# Patient Record
Sex: MALE | Race: WHITE | HISPANIC OR LATINO | Employment: FULL TIME | URBAN - NONMETROPOLITAN AREA
[De-identification: names, ages, dates, MRNs, and addresses within clinical notes are randomized per-mention and may not be internally consistent; named-entity substitution may affect disease eponyms.]

---

## 2021-06-19 ENCOUNTER — HOSPITAL ENCOUNTER (EMERGENCY)
Facility: HOSPITAL | Age: 46
Discharge: HOME | End: 2021-06-19
Attending: EMERGENCY MEDICINE
Payer: COMMERCIAL

## 2021-06-19 VITALS
SYSTOLIC BLOOD PRESSURE: 118 MMHG | TEMPERATURE: 97.9 F | DIASTOLIC BLOOD PRESSURE: 68 MMHG | BODY MASS INDEX: 33.05 KG/M2 | OXYGEN SATURATION: 95 % | HEART RATE: 76 BPM | HEIGHT: 64 IN | WEIGHT: 193.56 LBS | RESPIRATION RATE: 16 BRPM

## 2021-06-19 DIAGNOSIS — M54.32 SCIATICA OF LEFT SIDE: ICD-10-CM

## 2021-06-19 DIAGNOSIS — S16.1XXA STRAIN OF NECK MUSCLE, INITIAL ENCOUNTER: Primary | ICD-10-CM

## 2021-06-19 PROCEDURE — 99283 EMERGENCY DEPT VISIT LOW MDM: CPT

## 2021-06-19 PROCEDURE — 96372 THER/PROPH/DIAG INJ SC/IM: CPT

## 2021-06-19 PROCEDURE — 99284 EMERGENCY DEPT VISIT MOD MDM: CPT | Performed by: EMERGENCY MEDICINE

## 2021-06-19 RX ORDER — VITAMIN E 268 MG
400 CAPSULE ORAL DAILY
COMMUNITY
End: 2021-07-29 | Stop reason: ALTCHOICE

## 2021-06-19 RX ORDER — NAPROXEN 500 MG/1
500 TABLET ORAL 2 TIMES DAILY PRN
Qty: 30 TABLET | Refills: 0 | Status: SHIPPED | OUTPATIENT
Start: 2021-06-19 | End: 2021-12-01 | Stop reason: ALTCHOICE

## 2021-06-19 RX ORDER — CYCLOBENZAPRINE HCL 10 MG
10 TABLET ORAL 2 TIMES DAILY PRN
Qty: 20 TABLET | Refills: 0 | Status: SHIPPED | OUTPATIENT
Start: 2021-06-19 | End: 2021-12-01 | Stop reason: ALTCHOICE

## 2021-06-19 RX ORDER — KETOROLAC TROMETHAMINE 30 MG/ML
15 INJECTION, SOLUTION INTRAMUSCULAR; INTRAVENOUS ONCE
Status: COMPLETED | OUTPATIENT
Start: 2021-06-19 | End: 2021-06-19

## 2021-06-19 RX ADMIN — KETOROLAC TROMETHAMINE 15 MG: 30 INJECTION, SOLUTION INTRAMUSCULAR at 12:13

## 2021-06-19 NOTE — Clinical Note
Marilee Amharic was seen and treated in our emergency department on 6/19/2021  Diagnosis:     Cody Welch  may return to work on return date  He may return on this date: 06/21/2021         If you have any questions or concerns, please don't hesitate to call        Aliya Tracey DO    ______________________________           _______________          _______________  Hospital Representative                              Date                                Time

## 2021-06-19 NOTE — ED PROVIDER NOTES
History  Chief Complaint   Patient presents with    Neck Pain     past week    Headache     past week    Back Pain     past month     Patient presents emergency room with chief complaint of right-sided neck pain and left-sided back pain  Patient reports that ever since he started working for [employer] he is began experiencing these pains and aches  Patient reports that he does a fair amount of lifting at this job and that he has all overhead lifting and reaching many times throughout the course the day  With regards to the patient's neck pain patient reports that it is right-sided radiating into his right shoulder  He sometimes he sometimes can exacerbate this with movement of the right arm  No numbness or tingling  With regards to the left back pain  Patient reports he sometimes has a hot sensation going down his left leg to about the level of the knee  Is sometimes worse with movement  He has pain with palpation  Is slightly improved when he remains still  Patient has not used anything for control of his pain or discomfort        History provided by:  Patient  Neck Pain  Pain location:  R side  Quality:  Aching and cramping  Pain radiates to:  R shoulder, R scapula and R arm  Pain severity:  Moderate  Pain is:  Same all the time  Onset quality:  Gradual  Timing:  Intermittent  Progression:  Waxing and waning  Chronicity:  New  Context: lifting a heavy object    Context: not fall, not jumping from heights, not MCA, not MVC, not pedestrian accident and not recent injury    Relieved by:  None tried  Worsened by:  Bending  Ineffective treatments:  None tried  Associated symptoms: headaches and leg pain    Associated symptoms: no bladder incontinence, no bowel incontinence, no chest pain, no fever, no numbness, no paresis, no photophobia and no weakness    Headaches:     Severity:  Mild    Onset quality:  Gradual  Headache  Associated symptoms: back pain and neck pain    Associated symptoms: no abdominal pain, no congestion, no cough, no diarrhea, no dizziness, no ear pain, no fatigue, no fever, no myalgias, no nausea, no numbness, no photophobia, no sinus pressure, no sore throat, no vomiting and no weakness    Back Pain  Location:  Lumbar spine  Quality:  Aching  Radiates to:  L posterior upper leg and L thigh  Pain severity:  Moderate  Pain is:  Same all the time  Onset quality:  Gradual  Timing:  Constant  Progression:  Worsening  Chronicity:  New  Context: not emotional stress and not falling    Associated symptoms: headaches and leg pain    Associated symptoms: no abdominal pain, no bladder incontinence, no bowel incontinence, no chest pain, no dysuria, no fever, no numbness and no weakness        Prior to Admission Medications   Prescriptions Last Dose Informant Patient Reported? Taking?   vitamin E, tocopherol, 400 units capsule   Yes No   Sig: Take 400 Units by mouth daily      Facility-Administered Medications: None       Past Medical History:   Diagnosis Date    Asthma        History reviewed  No pertinent surgical history  History reviewed  No pertinent family history  I have reviewed and agree with the history as documented  E-Cigarette/Vaping    E-Cigarette Use Never User      E-Cigarette/Vaping Substances     Social History     Tobacco Use    Smoking status: Never Smoker    Smokeless tobacco: Never Used   Vaping Use    Vaping Use: Never used   Substance Use Topics    Alcohol use: Not Currently    Drug use: Not Currently       Review of Systems   Constitutional: Negative for activity change, fatigue and fever  HENT: Negative for congestion, ear pain, rhinorrhea, sinus pressure and sore throat  Eyes: Negative  Negative for photophobia, redness and itching  Respiratory: Negative for cough, chest tightness, shortness of breath and wheezing  Cardiovascular: Negative for chest pain, palpitations and leg swelling     Gastrointestinal: Negative for abdominal pain, bowel incontinence, diarrhea, nausea and vomiting  Endocrine: Negative  Genitourinary: Negative for bladder incontinence, dysuria, flank pain and frequency  Musculoskeletal: Positive for back pain and neck pain  Negative for arthralgias and myalgias  Skin: Negative  Negative for pallor and rash  Allergic/Immunologic: Negative  Neurological: Positive for headaches  Negative for dizziness, weakness and numbness  Hematological: Negative  Psychiatric/Behavioral: Negative  All other systems reviewed and are negative  Physical Exam  Physical Exam  Vitals and nursing note reviewed  Constitutional:       Appearance: Normal appearance  He is well-developed  He is not toxic-appearing  HENT:      Head: Normocephalic and atraumatic  Hair is normal       Jaw: No pain on movement  Right Ear: External ear normal       Left Ear: External ear normal       Nose: Nose normal       Mouth/Throat:      Mouth: Mucous membranes are moist       Pharynx: Oropharynx is clear  Eyes:      General: Lids are normal  No scleral icterus  Extraocular Movements: Extraocular movements intact  Conjunctiva/sclera: Conjunctivae normal       Pupils: Pupils are equal, round, and reactive to light  Neck:      Vascular: No carotid bruit  Trachea: Trachea normal      Cardiovascular:      Rate and Rhythm: Normal rate and regular rhythm  Heart sounds: Normal heart sounds  No murmur heard  Pulmonary:      Effort: Pulmonary effort is normal  No respiratory distress  Breath sounds: Normal breath sounds  No decreased breath sounds, wheezing, rhonchi or rales  Abdominal:      General: Abdomen is flat  There is no distension  Palpations: Abdomen is soft  Abdomen is not rigid  Tenderness: There is no abdominal tenderness  Musculoskeletal:         General: No deformity or signs of injury  Cervical back: Normal range of motion and neck supple  Spasms and tenderness present   No edema, erythema, signs of trauma, rigidity or crepitus  Pain with movement and muscular tenderness present  No spinous process tenderness  Lumbar back: Tenderness present  No swelling, edema, lacerations or spasms  Decreased range of motion  Negative right straight leg raise test and negative left straight leg raise test       Right lower leg: No edema  Left lower leg: No edema  Lymphadenopathy:      Cervical: No cervical adenopathy  Skin:     General: Skin is warm and dry  Coloration: Skin is not pale  Findings: No rash  Neurological:      General: No focal deficit present  Mental Status: He is alert and oriented to person, place, and time  Mental status is at baseline  Psychiatric:         Attention and Perception: Attention normal          Mood and Affect: Mood normal          Speech: Speech normal          Behavior: Behavior normal          Vital Signs  ED Triage Vitals [06/19/21 1147]   Temperature Pulse Respirations Blood Pressure SpO2   97 9 °F (36 6 °C) 76 16 118/68 98 %      Temp Source Heart Rate Source Patient Position - Orthostatic VS BP Location FiO2 (%)   Temporal Monitor -- Left arm --      Pain Score       Worst Possible Pain           Vitals:    06/19/21 1147 06/19/21 1215   BP: 118/68    Pulse: 76 76         Visual Acuity  Visual Acuity      Most Recent Value   L Pupil Size (mm)  3   R Pupil Size (mm)  3          ED Medications  Medications   ketorolac (TORADOL) injection 15 mg (15 mg Intramuscular Given 6/19/21 1213)       Diagnostic Studies  Results Reviewed     None                 No orders to display              Procedures  Procedures         ED Course                             SBIRT 20yo+      Most Recent Value   SBIRT (24 yo +)   In order to provide better care to our patients, we are screening all of our patients for alcohol and drug use  Would it be okay to ask you these screening questions? Yes Filed at: 06/19/2021 1150   Initial Alcohol Screen: US AUDIT-C    1   How often do you have a drink containing alcohol?  0 Filed at: 06/19/2021 1150   2  How many drinks containing alcohol do you have on a typical day you are drinking? 0 Filed at: 06/19/2021 1150   3a  Male UNDER 65: How often do you have five or more drinks on one occasion? 0 Filed at: 06/19/2021 1150   3b  FEMALE Any Age, or MALE 65+: How often do you have 4 or more drinks on one occassion? 0 Filed at: 06/19/2021 1150   Audit-C Score  0 Filed at: 06/19/2021 1150   IRVIN: How many times in the past year have you    Used an illegal drug or used a prescription medication for non-medical reasons? Never Filed at: 06/19/2021 1150                    MDM  Number of Diagnoses or Management Options  Sciatica of left side: new and requires workup  Strain of neck muscle, initial encounter: new and requires workup  Diagnosis management comments: Findings are consistent with sciatica as well as possible cervical radiculopathy likely secondary to muscle spasm  No clinical indication for imaging at this time as there is no blunt trauma and there is no risk factors associated for concern of more significant problem beyond that of musculoskeletal pain and spasm  Have started patient on medications for same  Have advised follow-up with PCP for recommendations with regards physical therapy  Patient reported that at this time he did not wish to file this through [email or web address] comp    Risk of Complications, Morbidity, and/or Mortality  Presenting problems: moderate  Diagnostic procedures: moderate  Management options: moderate    Patient Progress  Patient progress: stable      Disposition  Final diagnoses:   Strain of neck muscle, initial encounter   Sciatica of left side     Time reflects when diagnosis was documented in both MDM as applicable and the Disposition within this note     Time User Action Codes Description Comment    6/19/2021 12:04 PM Geovanna Oliver Add [S16  1XXA] Strain of neck muscle, initial encounter     6/19/2021 12:04 PM Kathy Lakewood Add [A97 98] Sciatica of left side       ED Disposition     ED Disposition Condition Date/Time Comment    Discharge Stable Sat 2021 12:03  [address] discharge to home/self care  Follow-up Information     Follow up With Specialties Details Why [address] Acute Pain Service In 1 week Even in well for possible referral to physical therapy [facility] [address]  [phone]            Discharge Medication List as of 2021 12:07 PM      START taking these medications    Details   cyclobenzaprine (FLEXERIL) 10 mg tablet Take 1 tablet (10 mg total) by mouth 2 (two) times a day as needed for muscle spasms, Starting Sat 2021, Normal      naproxen (NAPROSYN) 500 mg tablet Take 1 tablet (500 mg total) by mouth 2 (two) times a day as needed for mild pain or moderate pain, Starting Sat 2021, Normal         CONTINUE these medications which have NOT CHANGED    Details   vitamin E, tocopherol, 400 units capsule Take 400 Units by mouth daily, Historical Med           No discharge procedures on file      PDMP Review     None          ED Provider  Electronically Signed by           Emmanuelle Harrington DO  [phone]

## 2021-07-29 ENCOUNTER — HOSPITAL ENCOUNTER (EMERGENCY)
Facility: HOSPITAL | Age: 46
Discharge: HOME | End: 2021-07-29
Attending: EMERGENCY MEDICINE
Payer: COMMERCIAL

## 2021-07-29 VITALS
DIASTOLIC BLOOD PRESSURE: 76 MMHG | TEMPERATURE: 97.1 F | OXYGEN SATURATION: 95 % | SYSTOLIC BLOOD PRESSURE: 136 MMHG | RESPIRATION RATE: 17 BRPM | WEIGHT: 205.69 LBS | HEIGHT: 64 IN | BODY MASS INDEX: 35.12 KG/M2 | HEART RATE: 87 BPM

## 2021-07-29 DIAGNOSIS — S39.012A LUMBAR STRAIN: ICD-10-CM

## 2021-07-29 DIAGNOSIS — S16.1XXA CERVICAL STRAIN: Primary | ICD-10-CM

## 2021-07-29 PROCEDURE — 99283 EMERGENCY DEPT VISIT LOW MDM: CPT

## 2021-07-29 PROCEDURE — 99282 EMERGENCY DEPT VISIT SF MDM: CPT | Performed by: EMERGENCY MEDICINE

## 2021-07-29 NOTE — Clinical Note
Misti Bars was seen and treated in our emergency department on 7/29/2021  Diagnosis:     Freddie Pastor  may return to work on return date  He may return on this date: 08/02/2021         If you have any questions or concerns, please don't hesitate to call        Hilary Cruz DO    ______________________________           _______________          _______________  Hospital Representative                              Date                                Time

## 2021-07-29 NOTE — ED PROVIDER NOTES
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, desk work and requests work note      History provided by:  Patient  Back Pain  Location:  Lumbar spine  Quality:  Aching  Radiates to:  L knee  Pain severity:  Moderate (Sometimes severe)  Timing:  Constant  Progression:  Unchanged  Chronicity:  New  Context: lifting heavy objects    Relieved by: Tylenol and Advil, deferred steroids  Worsened by:  Bending and touching  Associated symptoms: no abdominal pain, no bladder incontinence, no bowel incontinence, no fever, no leg pain, no numbness, no paresthesias, no perianal numbness, no tingling and no weakness        Prior to Admission Medications   Prescriptions Last Dose Informant Patient Reported? Taking? cyclobenzaprine (FLEXERIL) 10 mg tablet   No Yes   Sig: Take 1 tablet (10 mg total) by mouth 2 (two) times a day as needed for muscle spasms   naproxen (NAPROSYN) 500 mg tablet   No Yes   Sig: Take 1 tablet (500 mg total) by mouth 2 (two) times a day as needed for mild pain or moderate pain      Facility-Administered Medications: None       Past Medical History:   Diagnosis Date    Asthma        History reviewed  No pertinent surgical history  History reviewed  No pertinent family history  I have reviewed and agree with the history as documented      E-Cigarette/Vaping    E-Cigarette Use Never User      E-Cigarette/Vaping Substances     Social History     Tobacco Use    Smoking status: Never Smoker    Smokeless tobacco: Never Used   Vaping Use    Vaping Use: Never used   Substance Use Topics    Alcohol use: Not Currently    Drug use: Not Currently       Review of Systems   Constitutional: Negative for fever  Gastrointestinal: Negative for abdominal pain and bowel incontinence  Genitourinary: Negative for bladder incontinence  Musculoskeletal: Positive for back pain  Neurological: Negative for tingling, weakness, numbness and paresthesias  All other systems reviewed and are negative  Physical Exam  Physical Exam  Vitals and nursing note reviewed  Constitutional:       Comments: Pleasant, comfortable-appearing, moves stiffly off stretcher, stands on heels and toes well, ambulates normally   HENT:      Head: Normocephalic and atraumatic  Eyes:      Conjunctiva/sclera: Conjunctivae normal       Pupils: Pupils are equal, round, and reactive to light  Cardiovascular:      Rate and Rhythm: Normal rate and regular rhythm  Heart sounds: Normal heart sounds  Comments: Dorsal pedal and posterior tibial pulses 2+ bilaterally  Pulmonary:      Effort: Pulmonary effort is normal       Breath sounds: Normal breath sounds  Abdominal:      General: Bowel sounds are normal  There is no distension  Palpations: Abdomen is soft  Tenderness: There is no abdominal tenderness  Musculoskeletal:         General: Tenderness present  No swelling  Normal range of motion  Cervical back: Neck supple  Comments: Right paracervical upper trapezius muscular tenderness and left paralumbar muscular tenderness   Skin:     General: Skin is warm and dry  Neurological:      General: No focal deficit present  Mental Status: He is alert and oriented to person, place, and time  Cranial Nerves: No cranial nerve deficit        Coordination: Coordination normal       Comments: Deep tendon reflexes at knees and ankles 1-2 over 4 bilaterally   Psychiatric:         Behavior: Behavior normal  Thought Content: Thought content normal          Judgment: Judgment normal          Vital Signs  ED Triage Vitals [07/29/21 0926]   Temperature Pulse Respirations Blood Pressure SpO2   (!) 97 1 °F (36 2 °C) 87 17 136/76 95 %      Temp Source Heart Rate Source Patient Position - Orthostatic VS BP Location FiO2 (%)   Temporal Monitor Sitting Right arm --      Pain Score       8           Vitals:    07/29/21 0926   BP: 136/76   Pulse: 87   Patient Position - Orthostatic VS: Sitting         Visual Acuity      ED Medications  Medications - No data to display    Diagnostic Studies  Results Reviewed     None                 No orders to display              Procedures  Procedures         ED Course                             SBIRT 22yo+      Most Recent Value   SBIRT (24 yo +)   In order to provide better care to our patients, we are screening all of our patients for alcohol and drug use  Would it be okay to ask you these screening questions? Yes Filed at: 07/29/2021 6245   Initial Alcohol Screen: US AUDIT-C    1  How often do you have a drink containing alcohol?  0 Filed at: 07/29/2021 0942   2  How many drinks containing alcohol do you have on a typical day you are drinking? 0 Filed at: 07/29/2021 0942   3a  Male UNDER 65: How often do you have five or more drinks on one occasion? 0 Filed at: 07/29/2021 0942   3b  FEMALE Any Age, or MALE 65+: How often do you have 4 or more drinks on one occassion? 0 Filed at: 07/29/2021 0942   Audit-C Score  0 Filed at: 07/29/2021 3319   IRVIN: How many times in the past year have you    Used an illegal drug or used a prescription medication for non-medical reasons?   Never Filed at: 07/29/2021 9997                    MDM  Number of Diagnoses or Management Options  Cervical strain: new and does not require workup  Lumbar strain: new and requires workup  Diagnosis management comments: Patient has family physician for follow-up, would like to reopen case for work related injuries and has available treatment, will return immediately if worse or new symptoms      Disposition  Final diagnoses:   Cervical strain   Lumbar strain     Time reflects when diagnosis was documented in both MDM as applicable and the Disposition within this note     Time User Action Codes Description Comment    7/29/2021  9:44 AM Ja León Add Carol Villedastiven  1XXA] Cervical strain     7/29/2021  9:44 AM Terrie Theodore Add [S39 012A] Lumbar strain       ED Disposition     ED Disposition Condition Date/Time Comment    Discharge Stable u Jul 29, 2021  9:43 AM Milka Pride discharge to home/self care  Follow-up Information     Follow up With Specialties Details Why [address] Acute Pain Service Schedule an appointment as soon as possible for a visit in 1 week  [address]  [phone]            Patient's Medications   Discharge Prescriptions    No medications on file     No discharge procedures on file      PDMP Review     None          ED Provider  Electronically Signed by           Sukhi Montana DO  07/29/21 2378

## 2021-09-28 ENCOUNTER — HOSPITAL ENCOUNTER (EMERGENCY)
Facility: HOSPITAL | Age: 46
Discharge: HOME | End: 2021-09-28
Attending: EMERGENCY MEDICINE | Admitting: EMERGENCY MEDICINE
Payer: COMMERCIAL

## 2021-09-28 ENCOUNTER — APPOINTMENT (EMERGENCY)
Dept: RADIOLOGY | Facility: HOSPITAL | Age: 46
End: 2021-09-28
Payer: COMMERCIAL

## 2021-09-28 VITALS
BODY MASS INDEX: 35.19 KG/M2 | OXYGEN SATURATION: 93 % | TEMPERATURE: 97 F | RESPIRATION RATE: 17 BRPM | WEIGHT: 206.13 LBS | HEART RATE: 89 BPM | SYSTOLIC BLOOD PRESSURE: 124 MMHG | DIASTOLIC BLOOD PRESSURE: 79 MMHG | HEIGHT: 64 IN

## 2021-09-28 DIAGNOSIS — S39.012A STRAIN OF LUMBAR REGION, INITIAL ENCOUNTER: Primary | ICD-10-CM

## 2021-09-28 DIAGNOSIS — M54.32 SCIATICA OF LEFT SIDE: ICD-10-CM

## 2021-09-28 PROCEDURE — 99284 EMERGENCY DEPT VISIT MOD MDM: CPT | Performed by: EMERGENCY MEDICINE

## 2021-09-28 PROCEDURE — 99283 EMERGENCY DEPT VISIT LOW MDM: CPT

## 2021-09-28 PROCEDURE — 72110 X-RAY EXAM L-2 SPINE 4/>VWS: CPT

## 2021-09-28 RX ORDER — LIDOCAINE 50 MG/G
1 PATCH TOPICAL DAILY
Qty: 10 PATCH | Refills: 0 | Status: SHIPPED | OUTPATIENT
Start: 2021-09-28 | End: 2021-12-01 | Stop reason: ALTCHOICE

## 2021-09-28 RX ORDER — NAPROXEN 500 MG/1
500 TABLET ORAL 2 TIMES DAILY WITH MEALS
Qty: 30 TABLET | Refills: 0 | Status: SHIPPED | OUTPATIENT
Start: 2021-09-28 | End: 2021-12-01 | Stop reason: ALTCHOICE

## 2021-09-28 RX ORDER — METHOCARBAMOL 500 MG/1
500 TABLET, FILM COATED ORAL 2 TIMES DAILY
Qty: 20 TABLET | Refills: 0 | Status: SHIPPED | OUTPATIENT
Start: 2021-09-28 | End: 2021-12-01 | Stop reason: ALTCHOICE

## 2021-11-01 ENCOUNTER — HOSPITAL ENCOUNTER (EMERGENCY)
Facility: HOSPITAL | Age: 46
Discharge: HOME | End: 2021-11-01
Attending: EMERGENCY MEDICINE
Payer: COMMERCIAL

## 2021-11-01 VITALS
WEIGHT: 180 LBS | HEART RATE: 80 BPM | TEMPERATURE: 98.4 F | BODY MASS INDEX: 30.73 KG/M2 | HEIGHT: 64 IN | RESPIRATION RATE: 20 BRPM | SYSTOLIC BLOOD PRESSURE: 120 MMHG | DIASTOLIC BLOOD PRESSURE: 85 MMHG | OXYGEN SATURATION: 96 %

## 2021-11-01 DIAGNOSIS — K04.7 DENTAL ABSCESS: ICD-10-CM

## 2021-11-01 DIAGNOSIS — K04.7 DENTAL INFECTION: Primary | ICD-10-CM

## 2021-11-01 DIAGNOSIS — K02.9 PAIN DUE TO DENTAL CARIES: ICD-10-CM

## 2021-11-01 PROCEDURE — 99284 EMERGENCY DEPT VISIT MOD MDM: CPT | Performed by: EMERGENCY MEDICINE

## 2021-11-01 PROCEDURE — 99283 EMERGENCY DEPT VISIT LOW MDM: CPT

## 2021-11-01 RX ORDER — AMOXICILLIN AND CLAVULANATE POTASSIUM 875; 125 MG/1; MG/1
1 TABLET, FILM COATED ORAL ONCE
Status: COMPLETED | OUTPATIENT
Start: 2021-11-01 | End: 2021-11-01

## 2021-11-01 RX ORDER — AMOXICILLIN AND CLAVULANATE POTASSIUM 875; 125 MG/1; MG/1
1 TABLET, FILM COATED ORAL EVERY 12 HOURS
Qty: 20 TABLET | Refills: 0 | Status: SHIPPED | OUTPATIENT
Start: 2021-11-01 | End: 2021-11-11

## 2021-11-01 RX ORDER — IBUPROFEN 600 MG/1
600 TABLET ORAL EVERY 6 HOURS PRN
Qty: 30 TABLET | Refills: 0 | Status: SHIPPED | OUTPATIENT
Start: 2021-11-01 | End: 2021-12-01 | Stop reason: ALTCHOICE

## 2021-11-01 RX ORDER — IBUPROFEN 400 MG/1
800 TABLET ORAL ONCE
Status: COMPLETED | OUTPATIENT
Start: 2021-11-01 | End: 2021-11-01

## 2021-11-01 RX ADMIN — AMOXICILLIN AND CLAVULANATE POTASSIUM 1 TABLET: 875; 125 TABLET, FILM COATED ORAL at 13:35

## 2021-11-01 RX ADMIN — IBUPROFEN 800 MG: 400 TABLET ORAL at 13:35

## 2021-11-02 ENCOUNTER — HOSPITAL ENCOUNTER (EMERGENCY)
Facility: HOSPITAL | Age: 46
Discharge: HOME | End: 2021-11-02
Attending: EMERGENCY MEDICINE | Admitting: EMERGENCY MEDICINE
Payer: COMMERCIAL

## 2021-11-02 VITALS
DIASTOLIC BLOOD PRESSURE: 82 MMHG | HEART RATE: 66 BPM | WEIGHT: 180 LBS | RESPIRATION RATE: 18 BRPM | HEIGHT: 64 IN | BODY MASS INDEX: 30.73 KG/M2 | OXYGEN SATURATION: 98 % | SYSTOLIC BLOOD PRESSURE: 117 MMHG | TEMPERATURE: 98.2 F

## 2021-11-02 DIAGNOSIS — Z77.29 CARBON MONOXIDE EXPOSURE: Primary | ICD-10-CM

## 2021-11-02 LAB — GAS + CO PNL BLDA: 1.3 % (ref 0–1.5)

## 2021-11-02 PROCEDURE — 99283 EMERGENCY DEPT VISIT LOW MDM: CPT

## 2021-11-02 PROCEDURE — 82375 ASSAY CARBOXYHB QUANT: CPT | Performed by: EMERGENCY MEDICINE

## 2021-11-02 PROCEDURE — 99284 EMERGENCY DEPT VISIT MOD MDM: CPT | Performed by: EMERGENCY MEDICINE

## 2021-11-02 PROCEDURE — 36415 COLL VENOUS BLD VENIPUNCTURE: CPT | Performed by: EMERGENCY MEDICINE

## 2021-12-01 ENCOUNTER — APPOINTMENT (EMERGENCY)
Dept: CT IMAGING | Facility: HOSPITAL | Age: 46
End: 2021-12-01
Payer: COMMERCIAL

## 2021-12-01 ENCOUNTER — APPOINTMENT (EMERGENCY)
Dept: RADIOLOGY | Facility: HOSPITAL | Age: 46
End: 2021-12-01
Payer: COMMERCIAL

## 2021-12-01 ENCOUNTER — HOSPITAL ENCOUNTER (EMERGENCY)
Facility: HOSPITAL | Age: 46
Discharge: HOME | End: 2021-12-01
Attending: EMERGENCY MEDICINE
Payer: COMMERCIAL

## 2021-12-01 VITALS
OXYGEN SATURATION: 98 % | RESPIRATION RATE: 18 BRPM | HEIGHT: 64 IN | DIASTOLIC BLOOD PRESSURE: 83 MMHG | TEMPERATURE: 97.3 F | WEIGHT: 200 LBS | BODY MASS INDEX: 34.15 KG/M2 | SYSTOLIC BLOOD PRESSURE: 116 MMHG | HEART RATE: 77 BPM

## 2021-12-01 DIAGNOSIS — E87.6 HYPOKALEMIA: ICD-10-CM

## 2021-12-01 DIAGNOSIS — E87.1 HYPONATREMIA: ICD-10-CM

## 2021-12-01 DIAGNOSIS — I63.9 STROKE (HCC): Primary | ICD-10-CM

## 2021-12-01 DIAGNOSIS — G43.109 OCULAR MIGRAINE: ICD-10-CM

## 2021-12-01 LAB
ANION GAP SERPL CALCULATED.3IONS-SCNC: 6 MMOL/L (ref 4–13)
ANION GAP SERPL CALCULATED.3IONS-SCNC: 9 MMOL/L (ref 4–13)
APTT PPP: 30 SECONDS (ref 23–37)
ATRIAL RATE: 76 BPM
BUN SERPL-MCNC: 12 MG/DL (ref 5–25)
BUN SERPL-MCNC: 9 MG/DL (ref 5–25)
CALCIUM SERPL-MCNC: 9 MG/DL (ref 8.3–10.1)
CALCIUM SERPL-MCNC: 9 MG/DL (ref 8.3–10.1)
CARDIAC TROPONIN I PNL SERPL HS: <2 NG/L
CHLORIDE SERPL-SCNC: 101 MMOL/L (ref 100–108)
CHLORIDE SERPL-SCNC: 94 MMOL/L (ref 100–108)
CO2 SERPL-SCNC: 28 MMOL/L (ref 21–32)
CO2 SERPL-SCNC: 29 MMOL/L (ref 21–32)
CREAT SERPL-MCNC: 0.94 MG/DL (ref 0.6–1.3)
CREAT SERPL-MCNC: 0.97 MG/DL (ref 0.6–1.3)
ERYTHROCYTE [DISTWIDTH] IN BLOOD BY AUTOMATED COUNT: 13.1 % (ref 11.6–15.1)
FLUAV RNA RESP QL NAA+PROBE: NEGATIVE
FLUBV RNA RESP QL NAA+PROBE: NEGATIVE
GFR SERPL CREATININE-BSD FRML MDRD: 93 ML/MIN/1.73SQ M
GFR SERPL CREATININE-BSD FRML MDRD: 96 ML/MIN/1.73SQ M
GLUCOSE SERPL-MCNC: 104 MG/DL (ref 65–140)
GLUCOSE SERPL-MCNC: 88 MG/DL (ref 65–140)
GLUCOSE SERPL-MCNC: 93 MG/DL (ref 65–140)
HCT VFR BLD AUTO: 44 % (ref 36.5–49.3)
HGB BLD-MCNC: 14.9 G/DL (ref 12–17)
INR PPP: 0.92 (ref 0.84–1.19)
MCH RBC QN AUTO: 29.3 PG (ref 26.8–34.3)
MCHC RBC AUTO-ENTMCNC: 33.9 G/DL (ref 31.4–37.4)
MCV RBC AUTO: 87 FL (ref 82–98)
P AXIS: 49 DEGREES
PLATELET # BLD AUTO: 186 THOUSANDS/UL (ref 149–390)
PMV BLD AUTO: 11.1 FL (ref 8.9–12.7)
POTASSIUM SERPL-SCNC: 3.4 MMOL/L (ref 3.5–5.3)
POTASSIUM SERPL-SCNC: 4.1 MMOL/L (ref 3.5–5.3)
PR INTERVAL: 164 MS
PROTHROMBIN TIME: 12.3 SECONDS (ref 11.6–14.5)
QRS AXIS: -9 DEGREES
QRSD INTERVAL: 84 MS
QT INTERVAL: 378 MS
QTC INTERVAL: 425 MS
RBC # BLD AUTO: 5.08 MILLION/UL (ref 3.88–5.62)
RSV RNA RESP QL NAA+PROBE: NEGATIVE
SARS-COV-2 RNA RESP QL NAA+PROBE: NEGATIVE
SODIUM SERPL-SCNC: 129 MMOL/L (ref 136–145)
SODIUM SERPL-SCNC: 138 MMOL/L (ref 136–145)
T WAVE AXIS: 8 DEGREES
VENTRICULAR RATE: 76 BPM
WBC # BLD AUTO: 4.78 THOUSAND/UL (ref 4.31–10.16)

## 2021-12-01 PROCEDURE — 70498 CT ANGIOGRAPHY NECK: CPT

## 2021-12-01 PROCEDURE — 85610 PROTHROMBIN TIME: CPT | Performed by: EMERGENCY MEDICINE

## 2021-12-01 PROCEDURE — 93005 ELECTROCARDIOGRAM TRACING: CPT

## 2021-12-01 PROCEDURE — G0426 INPT/ED TELECONSULT50: HCPCS | Performed by: PSYCHIATRY & NEUROLOGY

## 2021-12-01 PROCEDURE — 99285 EMERGENCY DEPT VISIT HI MDM: CPT

## 2021-12-01 PROCEDURE — G1004 CDSM NDSC: HCPCS

## 2021-12-01 PROCEDURE — 96361 HYDRATE IV INFUSION ADD-ON: CPT

## 2021-12-01 PROCEDURE — 71045 X-RAY EXAM CHEST 1 VIEW: CPT

## 2021-12-01 PROCEDURE — 70496 CT ANGIOGRAPHY HEAD: CPT

## 2021-12-01 PROCEDURE — 96360 HYDRATION IV INFUSION INIT: CPT

## 2021-12-01 PROCEDURE — 82948 REAGENT STRIP/BLOOD GLUCOSE: CPT

## 2021-12-01 PROCEDURE — 99285 EMERGENCY DEPT VISIT HI MDM: CPT | Performed by: EMERGENCY MEDICINE

## 2021-12-01 PROCEDURE — 0241U HB NFCT DS VIR RESP RNA 4 TRGT: CPT | Performed by: EMERGENCY MEDICINE

## 2021-12-01 PROCEDURE — 36415 COLL VENOUS BLD VENIPUNCTURE: CPT | Performed by: EMERGENCY MEDICINE

## 2021-12-01 PROCEDURE — 80048 BASIC METABOLIC PNL TOTAL CA: CPT | Performed by: EMERGENCY MEDICINE

## 2021-12-01 PROCEDURE — 85730 THROMBOPLASTIN TIME PARTIAL: CPT | Performed by: EMERGENCY MEDICINE

## 2021-12-01 PROCEDURE — 85027 COMPLETE CBC AUTOMATED: CPT | Performed by: EMERGENCY MEDICINE

## 2021-12-01 PROCEDURE — 84484 ASSAY OF TROPONIN QUANT: CPT | Performed by: EMERGENCY MEDICINE

## 2021-12-01 RX ORDER — POTASSIUM CHLORIDE 20 MEQ/1
40 TABLET, EXTENDED RELEASE ORAL ONCE
Status: COMPLETED | OUTPATIENT
Start: 2021-12-01 | End: 2021-12-01

## 2021-12-01 RX ORDER — NAPROXEN 375 MG/1
375 TABLET ORAL 2 TIMES DAILY WITH MEALS
Qty: 20 TABLET | Refills: 0 | Status: SHIPPED | OUTPATIENT
Start: 2021-12-01 | End: 2022-01-09

## 2021-12-01 RX ORDER — POTASSIUM CHLORIDE 20 MEQ/1
20 TABLET, EXTENDED RELEASE ORAL 2 TIMES DAILY
Qty: 20 TABLET | Refills: 0 | Status: SHIPPED | OUTPATIENT
Start: 2021-12-01 | End: 2022-01-09

## 2021-12-01 RX ADMIN — SODIUM CHLORIDE 1000 ML: 0.9 INJECTION, SOLUTION INTRAVENOUS at 10:06

## 2021-12-01 RX ADMIN — IOHEXOL 85 ML: 350 INJECTION, SOLUTION INTRAVENOUS at 08:56

## 2021-12-01 RX ADMIN — POTASSIUM CHLORIDE 40 MEQ: 1500 TABLET, EXTENDED RELEASE ORAL at 10:06

## 2021-12-15 ENCOUNTER — TELEPHONE (OUTPATIENT)
Dept: NEUROLOGY | Facility: CLINIC | Age: 46
End: 2021-12-15

## 2022-01-08 ENCOUNTER — HOSPITAL ENCOUNTER (EMERGENCY)
Facility: HOSPITAL | Age: 47
Discharge: HOME | End: 2022-01-08
Attending: STUDENT IN AN ORGANIZED HEALTH CARE EDUCATION/TRAINING PROGRAM | Admitting: STUDENT IN AN ORGANIZED HEALTH CARE EDUCATION/TRAINING PROGRAM
Payer: COMMERCIAL

## 2022-01-08 VITALS
SYSTOLIC BLOOD PRESSURE: 129 MMHG | RESPIRATION RATE: 16 BRPM | HEIGHT: 64 IN | BODY MASS INDEX: 35.76 KG/M2 | WEIGHT: 209.44 LBS | DIASTOLIC BLOOD PRESSURE: 78 MMHG | TEMPERATURE: 100.2 F | OXYGEN SATURATION: 94 % | HEART RATE: 110 BPM

## 2022-01-08 DIAGNOSIS — R50.9 FEVER: Primary | ICD-10-CM

## 2022-01-08 DIAGNOSIS — R52 BODY ACHES: ICD-10-CM

## 2022-01-08 PROCEDURE — U0005 INFEC AGEN DETEC AMPLI PROBE: HCPCS | Performed by: STUDENT IN AN ORGANIZED HEALTH CARE EDUCATION/TRAINING PROGRAM

## 2022-01-08 PROCEDURE — 99284 EMERGENCY DEPT VISIT MOD MDM: CPT | Performed by: STUDENT IN AN ORGANIZED HEALTH CARE EDUCATION/TRAINING PROGRAM

## 2022-01-08 PROCEDURE — U0003 INFECTIOUS AGENT DETECTION BY NUCLEIC ACID (DNA OR RNA); SEVERE ACUTE RESPIRATORY SYNDROME CORONAVIRUS 2 (SARS-COV-2) (CORONAVIRUS DISEASE [COVID-19]), AMPLIFIED PROBE TECHNIQUE, MAKING USE OF HIGH THROUGHPUT TECHNOLOGIES AS DESCRIBED BY CMS-2020-01-R: HCPCS | Performed by: STUDENT IN AN ORGANIZED HEALTH CARE EDUCATION/TRAINING PROGRAM

## 2022-01-08 PROCEDURE — 99283 EMERGENCY DEPT VISIT LOW MDM: CPT

## 2022-01-08 RX ORDER — IBUPROFEN 600 MG/1
600 TABLET ORAL ONCE
Status: COMPLETED | OUTPATIENT
Start: 2022-01-08 | End: 2022-01-08

## 2022-01-08 RX ORDER — ACETAMINOPHEN 325 MG/1
975 TABLET ORAL ONCE
Status: COMPLETED | OUTPATIENT
Start: 2022-01-08 | End: 2022-01-08

## 2022-01-08 RX ADMIN — ACETAMINOPHEN 975 MG: 325 TABLET ORAL at 01:27

## 2022-01-08 RX ADMIN — IBUPROFEN 600 MG: 600 TABLET ORAL at 01:27

## 2022-01-08 NOTE — ED PROVIDER NOTES
History  Chief Complaint   Patient presents with    Fever - 9 weeks to 74 years     Patient states, "I just kept waking up and having shivers " States his daughter has cold like symptoms as well  States fever unresolved with tylenol  Fever - 9 weeks to 74 years  Max temp prior to arrival:  101  Temp source:  Oral  Onset quality:  Sudden  Duration:  5 hours  Timing:  Intermittent  Progression:  Unchanged  Chronicity:  New  Relieved by:  Acetaminophen  Worsened by:  Nothing  Associated symptoms: chills and myalgias    Associated symptoms: no chest pain, no confusion, no congestion, no cough, no diarrhea, no dysuria, no headaches, no nausea, no rash, no rhinorrhea, no sore throat and no vomiting    Risk factors: sick contacts       51year old M  Presents to the ED with chills, muscle aches  States that he had a sore throat earlier in the day  The patient's daughter is currently ill with URI symptoms  The patient is not COVID vaccinated  Has been eating/drinking well  Developed fever, chills, myalgias this evening  Mildly improved with a dose of Tylenol  Prior to Admission Medications   Prescriptions Last Dose Informant Patient Reported? Taking?   naproxen (NAPROSYN) 375 mg tablet Not Taking at Unknown time  No No   Sig: Take 1 tablet (375 mg total) by mouth 2 (two) times a day with meals   Patient not taking: Reported on 1/8/2022    potassium chloride (K-DUR,KLOR-CON) 20 mEq tablet Not Taking at Unknown time  No No   Sig: Take 1 tablet (20 mEq total) by mouth 2 (two) times a day   Patient not taking: Reported on 1/8/2022       Facility-Administered Medications: None       Past Medical History:   Diagnosis Date    Asthma     Kidney stones     Migraines        History reviewed  No pertinent surgical history  History reviewed  No pertinent family history  I have reviewed and agree with the history as documented      E-Cigarette/Vaping    E-Cigarette Use Never User      E-Cigarette/Vaping Substances    Nicotine No     THC No     CBD No     Flavoring No      Social History     Tobacco Use    Smoking status: Never Smoker    Smokeless tobacco: Never Used   Vaping Use    Vaping Use: Never used   Substance Use Topics    Alcohol use: Not Currently    Drug use: Not Currently     Review of Systems   Constitutional: Positive for chills and fever  Negative for diaphoresis  HENT: Negative for congestion, rhinorrhea, sinus pressure, sinus pain and sore throat  Eyes: Negative for pain and visual disturbance  Respiratory: Negative for cough, chest tightness, shortness of breath and wheezing  Cardiovascular: Negative for chest pain, palpitations and leg swelling  Gastrointestinal: Negative for abdominal pain, diarrhea, nausea and vomiting  Genitourinary: Negative for difficulty urinating, dysuria, flank pain, frequency and urgency  Musculoskeletal: Positive for myalgias  Negative for back pain and neck pain  Skin: Negative for color change, rash and wound  Neurological: Negative for dizziness, syncope, weakness, light-headedness and headaches  Hematological: Does not bruise/bleed easily  Psychiatric/Behavioral: Negative for confusion and sleep disturbance  Physical Exam  Physical Exam  Vitals and nursing note reviewed  Constitutional:       General: He is not in acute distress  Appearance: He is ill-appearing  He is not toxic-appearing  HENT:      Head: Normocephalic and atraumatic  Right Ear: External ear normal       Left Ear: External ear normal       Nose: No congestion or rhinorrhea  Mouth/Throat:      Mouth: Mucous membranes are moist       Pharynx: Oropharynx is clear  No oropharyngeal exudate or posterior oropharyngeal erythema  Eyes:      General:         Right eye: No discharge  Left eye: No discharge  Extraocular Movements: Extraocular movements intact        Conjunctiva/sclera: Conjunctivae normal    Cardiovascular:      Rate and Rhythm: Normal rate and regular rhythm  Pulses: Normal pulses  Heart sounds: Normal heart sounds  No murmur heard  Pulmonary:      Effort: Pulmonary effort is normal  No respiratory distress  Breath sounds: Normal breath sounds  No stridor  No wheezing, rhonchi or rales  Chest:      Chest wall: No tenderness  Abdominal:      General: Abdomen is flat  Bowel sounds are normal  There is no distension  Palpations: Abdomen is soft  There is no mass  Tenderness: There is no abdominal tenderness  There is no right CVA tenderness, left CVA tenderness, guarding or rebound  Hernia: No hernia is present  Musculoskeletal:         General: No swelling, tenderness, deformity or signs of injury  Cervical back: Neck supple  No tenderness  Right lower leg: No edema  Left lower leg: No edema  Skin:     General: Skin is warm and dry  Capillary Refill: Capillary refill takes less than 2 seconds  Coloration: Skin is not jaundiced or pale  Findings: No bruising, erythema or rash  Neurological:      General: No focal deficit present  Mental Status: He is alert and oriented to person, place, and time  Cranial Nerves: No cranial nerve deficit  Sensory: No sensory deficit  Motor: No weakness  Psychiatric:         Mood and Affect: Mood normal          Behavior: Behavior normal          Thought Content:  Thought content normal          Judgment: Judgment normal        Vital Signs  ED Triage Vitals [01/08/22 0110]   Temperature Pulse Respirations Blood Pressure SpO2   100 2 °F (37 9 °C) (!) 121 16 130/80 96 %      Temp Source Heart Rate Source Patient Position - Orthostatic VS BP Location FiO2 (%)   Temporal Monitor Lying Left arm --      Pain Score       10 - Worst Possible Pain           Vitals:    01/08/22 0110 01/08/22 0203   BP: 130/80 129/78   Pulse: (!) 121 (!) 110   Patient Position - Orthostatic VS: Lying      ED Medications  Medications   ibuprofen (MOTRIN) tablet 600 mg (600 mg Oral Given 1/8/22 0127)   acetaminophen (TYLENOL) tablet 975 mg (975 mg Oral Given 1/8/22 0127)     Diagnostic Studies  Results Reviewed     Procedure Component Value Units Date/Time    COVID only [[id number]] Collected: 01/08/22 0121    Lab Status: In process Specimen: Nares from Nose Updated: 01/08/22 0131             No orders to display          Procedures  Procedures     ED Course       MDM     51year old M  Presents to the ED with fever, chills, myalgias  Not COVID vaccinated  The patient's daughter is ill with URI symptoms  Has been eating and drinking well  SpO2 >94% on RA  The patient was administered a dose of Tylenol/Motrin  He was able to tolerate PO hydration  COVID test is pending  He will be notified with the results  Supportive care measures and return precautions were discussed with the patient  He expressed understanding  The patient was stable for discharge  Disposition  Final diagnoses:   Fever   Body aches     Time reflects when diagnosis was documented in both MDM as applicable and the Disposition within this note     Time User Action Codes Description Comment    1/8/2022  1:56 AM Adrian Even Add [R50 9] Fever     1/8/2022  1:56 AM Adrian Even Add [R52] Body aches       ED Disposition     ED Disposition Condition Date/Time Comment    Discharge Stable Sat Jan 8, 2022  1:56 AM Ming Pride discharge to home/self care  Follow-up Information    None         Discharge Medication List as of 1/8/2022  1:57 AM      CONTINUE these medications which have NOT CHANGED    Details   naproxen (NAPROSYN) 375 mg tablet Take 1 tablet (375 mg total) by mouth 2 (two) times a day with meals, Starting Wed 12/1/2021, Normal      potassium chloride (K-DUR,KLOR-CON) 20 mEq tablet Take 1 tablet (20 mEq total) by mouth 2 (two) times a day, Starting Wed 12/1/2021, Normal             No discharge procedures on file      PDMP Review     None          ED Provider  Electronically Signed by           Elvie Warren DO  01/08/22 0227

## 2022-01-08 NOTE — DISCHARGE INSTRUCTIONS
You were evaluated in the ED for fever and body aches  You will be notified with results of your COVID test   In the meantime, drink plenty of fluids  For fever/body aches, you can take Motrin 600 mg every 6 hours and/or Tylenol 1000 mg every 6 hours  Do not hesitate to return to the emergency department for any concerning signs or symptoms

## 2022-01-09 ENCOUNTER — APPOINTMENT (EMERGENCY)
Dept: RADIOLOGY | Facility: HOSPITAL | Age: 47
End: 2022-01-09
Payer: COMMERCIAL

## 2022-01-09 ENCOUNTER — HOSPITAL ENCOUNTER (EMERGENCY)
Facility: HOSPITAL | Age: 47
Discharge: HOME | End: 2022-01-09
Attending: EMERGENCY MEDICINE
Payer: COMMERCIAL

## 2022-01-09 VITALS
WEIGHT: 205.25 LBS | BODY MASS INDEX: 35.23 KG/M2 | SYSTOLIC BLOOD PRESSURE: 113 MMHG | TEMPERATURE: 99.2 F | DIASTOLIC BLOOD PRESSURE: 67 MMHG | RESPIRATION RATE: 17 BRPM | OXYGEN SATURATION: 94 % | HEART RATE: 75 BPM

## 2022-01-09 DIAGNOSIS — B34.9 VIRAL SYNDROME: Primary | ICD-10-CM

## 2022-01-09 LAB
ALBUMIN SERPL BCP-MCNC: 3.7 G/DL (ref 3.5–5)
ALP SERPL-CCNC: 96 U/L (ref 46–116)
ALT SERPL W P-5'-P-CCNC: 88 U/L (ref 12–78)
ANION GAP SERPL CALCULATED.3IONS-SCNC: 10 MMOL/L (ref 4–13)
AST SERPL W P-5'-P-CCNC: 56 U/L (ref 5–45)
BASOPHILS # BLD AUTO: 0.02 THOUSANDS/ΜL (ref 0–0.1)
BASOPHILS NFR BLD AUTO: 1 % (ref 0–1)
BILIRUB SERPL-MCNC: 0.54 MG/DL (ref 0.2–1)
BUN SERPL-MCNC: 5 MG/DL (ref 5–25)
CALCIUM SERPL-MCNC: 8.5 MG/DL (ref 8.3–10.1)
CHLORIDE SERPL-SCNC: 102 MMOL/L (ref 100–108)
CK MB SERPL-MCNC: <1 % (ref 0–2.5)
CK MB SERPL-MCNC: <1 NG/ML (ref 0–5)
CK SERPL-CCNC: 201 U/L (ref 39–308)
CO2 SERPL-SCNC: 27 MMOL/L (ref 21–32)
CREAT SERPL-MCNC: 0.98 MG/DL (ref 0.6–1.3)
CRP SERPL QL: 11.5 MG/L
EOSINOPHIL # BLD AUTO: 0.01 THOUSAND/ΜL (ref 0–0.61)
EOSINOPHIL NFR BLD AUTO: 0 % (ref 0–6)
ERYTHROCYTE [DISTWIDTH] IN BLOOD BY AUTOMATED COUNT: 12.8 % (ref 11.6–15.1)
GFR SERPL CREATININE-BSD FRML MDRD: 91 ML/MIN/1.73SQ M
GLUCOSE SERPL-MCNC: 98 MG/DL (ref 65–140)
HCT VFR BLD AUTO: 41.2 % (ref 36.5–49.3)
HGB BLD-MCNC: 13.9 G/DL (ref 12–17)
IMM GRANULOCYTES # BLD AUTO: 0 THOUSAND/UL (ref 0–0.2)
IMM GRANULOCYTES NFR BLD AUTO: 0 % (ref 0–2)
LYMPHOCYTES # BLD AUTO: 0.79 THOUSANDS/ΜL (ref 0.6–4.47)
LYMPHOCYTES NFR BLD AUTO: 23 % (ref 14–44)
MCH RBC QN AUTO: 29.3 PG (ref 26.8–34.3)
MCHC RBC AUTO-ENTMCNC: 33.7 G/DL (ref 31.4–37.4)
MCV RBC AUTO: 87 FL (ref 82–98)
MONOCYTES # BLD AUTO: 0.33 THOUSAND/ΜL (ref 0.17–1.22)
MONOCYTES NFR BLD AUTO: 10 % (ref 4–12)
NEUTROPHILS # BLD AUTO: 2.24 THOUSANDS/ΜL (ref 1.85–7.62)
NEUTS SEG NFR BLD AUTO: 66 % (ref 43–75)
NRBC BLD AUTO-RTO: 0 /100 WBCS
PLATELET # BLD AUTO: 153 THOUSANDS/UL (ref 149–390)
PMV BLD AUTO: 10.6 FL (ref 8.9–12.7)
POTASSIUM SERPL-SCNC: 3.8 MMOL/L (ref 3.5–5.3)
PROT SERPL-MCNC: 7.4 G/DL (ref 6.4–8.2)
RBC # BLD AUTO: 4.75 MILLION/UL (ref 3.88–5.62)
SODIUM SERPL-SCNC: 139 MMOL/L (ref 136–145)
WBC # BLD AUTO: 3.39 THOUSAND/UL (ref 4.31–10.16)

## 2022-01-09 PROCEDURE — 82550 ASSAY OF CK (CPK): CPT | Performed by: EMERGENCY MEDICINE

## 2022-01-09 PROCEDURE — 86140 C-REACTIVE PROTEIN: CPT | Performed by: EMERGENCY MEDICINE

## 2022-01-09 PROCEDURE — 96361 HYDRATE IV INFUSION ADD-ON: CPT

## 2022-01-09 PROCEDURE — 99284 EMERGENCY DEPT VISIT MOD MDM: CPT | Performed by: EMERGENCY MEDICINE

## 2022-01-09 PROCEDURE — 71045 X-RAY EXAM CHEST 1 VIEW: CPT

## 2022-01-09 PROCEDURE — 85025 COMPLETE CBC W/AUTO DIFF WBC: CPT | Performed by: EMERGENCY MEDICINE

## 2022-01-09 PROCEDURE — 36415 COLL VENOUS BLD VENIPUNCTURE: CPT | Performed by: EMERGENCY MEDICINE

## 2022-01-09 PROCEDURE — 96374 THER/PROPH/DIAG INJ IV PUSH: CPT

## 2022-01-09 PROCEDURE — 82553 CREATINE MB FRACTION: CPT | Performed by: EMERGENCY MEDICINE

## 2022-01-09 PROCEDURE — 80053 COMPREHEN METABOLIC PANEL: CPT | Performed by: EMERGENCY MEDICINE

## 2022-01-09 PROCEDURE — 96375 TX/PRO/DX INJ NEW DRUG ADDON: CPT

## 2022-01-09 PROCEDURE — 99284 EMERGENCY DEPT VISIT MOD MDM: CPT

## 2022-01-09 RX ORDER — ACETAMINOPHEN 325 MG/1
650 TABLET ORAL ONCE
Status: COMPLETED | OUTPATIENT
Start: 2022-01-09 | End: 2022-01-09

## 2022-01-09 RX ORDER — DEXAMETHASONE SODIUM PHOSPHATE 4 MG/ML
10 INJECTION, SOLUTION INTRA-ARTICULAR; INTRALESIONAL; INTRAMUSCULAR; INTRAVENOUS; SOFT TISSUE ONCE
Status: COMPLETED | OUTPATIENT
Start: 2022-01-09 | End: 2022-01-09

## 2022-01-09 RX ORDER — KETOROLAC TROMETHAMINE 30 MG/ML
15 INJECTION, SOLUTION INTRAMUSCULAR; INTRAVENOUS ONCE
Status: COMPLETED | OUTPATIENT
Start: 2022-01-09 | End: 2022-01-09

## 2022-01-09 RX ORDER — ONDANSETRON 2 MG/ML
4 INJECTION INTRAMUSCULAR; INTRAVENOUS ONCE
Status: COMPLETED | OUTPATIENT
Start: 2022-01-09 | End: 2022-01-09

## 2022-01-09 RX ORDER — DOXYCYCLINE HYCLATE 100 MG/1
100 CAPSULE ORAL 2 TIMES DAILY
Qty: 20 CAPSULE | Refills: 0 | Status: SHIPPED | OUTPATIENT
Start: 2022-01-09 | End: 2022-01-19

## 2022-01-09 RX ADMIN — SODIUM CHLORIDE 1500 ML: 0.9 INJECTION, SOLUTION INTRAVENOUS at 11:14

## 2022-01-09 RX ADMIN — ACETAMINOPHEN 650 MG: 325 TABLET ORAL at 11:10

## 2022-01-09 RX ADMIN — DEXAMETHASONE SODIUM PHOSPHATE 10 MG: 4 INJECTION, SOLUTION INTRAMUSCULAR; INTRAVENOUS at 12:25

## 2022-01-09 RX ADMIN — ONDANSETRON 4 MG: 2 INJECTION INTRAMUSCULAR; INTRAVENOUS at 11:13

## 2022-01-09 RX ADMIN — KETOROLAC TROMETHAMINE 15 MG: 30 INJECTION, SOLUTION INTRAMUSCULAR at 11:12

## 2022-01-09 NOTE — DISCHARGE INSTRUCTIONS
Alternate Tylenol every 3 hours with Advil/Motrin/ibuprofen for the fevers and aches  Stay hydrated  We will call you with your COVID test results  [address]    Your healthcare provider and/or public health staff have evaluated you and have determined that you do not need to remain in the hospital at this time  At this time you can be isolated at home where you will be monitored by staff from your local or state health department  You should carefully follow the prevention and isolation steps below until a healthcare provider or local or state health department says that you can return to your normal activities  Stay home except to get medical care    People who are mildly ill with COVID-19 are able to isolate at home during their illness  You should restrict activities outside your home, except for getting medical care  Do not go to work, school, or public areas  Avoid using public transportation, ride-sharing, or taxis  Separate yourself from other people and animals in your home    People: As much as possible, you should stay in a specific room and away from other people in your home  Also, you should use a separate bathroom, if available  Animals: You should restrict contact with pets and other animals while you are sick with COVID-19, just like you would around other people  Although there have not been reports of pets or other animals becoming sick with COVID-19, it is still recommended that people sick with COVID-19 limit contact with animals until more information is known about the virus  When possible, have another member of your household care for your animals while you are sick  If you are sick with COVID-19, avoid contact with your pet, including petting, snuggling, being kissed or licked, and sharing food  If you must care for your pet or be around animals while you are sick, wash your hands before and after you interact with pets and wear a facemask   See BJW-06 and Animals for more information  Call ahead before visiting your doctor    If you have a medical appointment, call the healthcare provider and tell them that you have or may have COVID-19  This will help the healthcare providers office take steps to keep other people from getting infected or exposed  Wear a facemask    You should wear a facemask when you are around other people (e g , sharing a room or vehicle) or pets and before you enter a healthcare providers office  If you are not able to wear a facemask (for example, because it causes trouble breathing), then people who live with you should not stay in the same room with you, or they should wear a facemask if they enter your room  Cover your coughs and sneezes    Cover your mouth and nose with a tissue when you cough or sneeze  Throw used tissues in a lined trash can  Immediately wash your hands with soap and water for at least 20 seconds or, if soap and water are not available, clean your hands with an alcohol-based hand  that contains at least 60% alcohol  Clean your hands often    Wash your hands often with soap and water for at least 20 seconds, especially after blowing your nose, coughing, or sneezing; going to the bathroom; and before eating or preparing food  If soap and water are not readily available, use an alcohol-based hand  with at least 60% alcohol, covering all surfaces of your hands and rubbing them together until they feel dry  Soap and water are the best option if hands are visibly dirty  Avoid touching your eyes, nose, and mouth with unwashed hands  Avoid sharing personal household items    You should not share dishes, drinking glasses, cups, eating utensils, towels, or bedding with other people or pets in your home  After using these items, they should be washed thoroughly with soap and water      Clean all high-touch surfaces everyday    High touch surfaces include counters, tabletops, doorknobs, bathroom fixtures, toilets, phones, keyboards, tablets, and bedside tables  Also, clean any surfaces that may have blood, stool, or body fluids on them  Use a household cleaning spray or wipe, according to the label instructions  Labels contain instructions for safe and effective use of the cleaning product including precautions you should take when applying the product, such as wearing gloves and making sure you have good ventilation during use of the product  Monitor your symptoms    Seek prompt medical attention if your illness is worsening (e g , difficulty breathing)  Before seeking care, call your healthcare provider and tell them that you have, or are being evaluated for, COVID-19  Put on a facemask before you enter the facility  These steps will help the healthcare providers office to keep other people in the office or waiting room from getting infected or exposed  Ask your healthcare provider to call the local or [facility] health department  Persons who are placed under active monitoring or facilitated self-monitoring should follow instructions provided by their local health department or occupational health professionals, as appropriate  If you have a medical emergency and need to call 911, notify the dispatch personnel that you have, or are being evaluated for COVID-19  If possible, put on a facemask before emergency medical services arrive  Discontinuing home isolation    Patients with confirmed COVID-19 should remain under home isolation precautions until the following conditions are met:    They have had no fever for at least 24 hours (that is one full day of no fever without the use medicine that reduces fevers)  AND  other symptoms have improved (for example, when their cough or shortness of breath have improved)  AND  If had mild or moderate illness, at least 10 days have passed since their symptoms first appeared or if severe illness (needed oxygen) or immunosuppressed, at least 20 days have passed since symptoms first appeared  Patients with confirmed COVID-19 should also notify close contacts (including their workplace) and ask that they self-quarantine  Currently, close contact is defined as being within 6 feet for 15 minutes or more from the period 24 hours starting 48 hours before symptom onset to the time at which the patient went into isolation  Close contacts of patients diagnosed with COVID-19 should be instructed by the patient to self-quarantine for 14 days from the last time of their last contact with the patient  Source: [employer]        Please obtain a portable pulse ox monitor  Please return if your symptoms worsen  Please return for increased shortness of breath  Please return if your pulse ox monitor registered her oxygen level below 90%

## 2022-01-09 NOTE — ED NOTES
Ambulating SPO2 completed, PT 95% on room air  Provider made aware        Suzanne Carmichael RN  01/09/22 6501 [address] Hallie Trimble RN  01/09/22 1574

## 2022-01-09 NOTE — Clinical Note
Mahendra Li was seen and treated in our emergency department on 1/9/2022  off work 1/9/22-1/14/22    Diagnosis:     Emy Moseley    He may return on this date: If you have any questions or concerns, please don't hesitate to call        Alida Favre, MD    ______________________________           _______________          _______________  Hospital Representative                              Date                                Time

## 2022-01-10 LAB — SARS-COV-2 RNA RESP QL NAA+PROBE: POSITIVE

## 2022-02-10 ENCOUNTER — TELEPHONE (OUTPATIENT)
Dept: NEUROLOGY | Facility: CLINIC | Age: 47
End: 2022-02-10

## 2022-02-10 NOTE — TELEPHONE ENCOUNTER
Spoke to patient regarding [facility]  [facility] appointment with Erik Sutton on 3/24  Will refer to [facility] for office address

## 2022-03-08 ENCOUNTER — TELEPHONE (OUTPATIENT)
Dept: NEUROLOGY | Facility: CLINIC | Age: 47
End: 2022-03-08

## 2022-03-19 ENCOUNTER — HOSPITAL ENCOUNTER (EMERGENCY)
Facility: HOSPITAL | Age: 47
Discharge: HOME | End: 2022-03-19
Attending: EMERGENCY MEDICINE | Admitting: EMERGENCY MEDICINE
Payer: COMMERCIAL

## 2022-03-19 VITALS
DIASTOLIC BLOOD PRESSURE: 94 MMHG | SYSTOLIC BLOOD PRESSURE: 127 MMHG | WEIGHT: 204.2 LBS | RESPIRATION RATE: 16 BRPM | BODY MASS INDEX: 35.05 KG/M2 | OXYGEN SATURATION: 95 % | HEART RATE: 72 BPM | TEMPERATURE: 98.1 F

## 2022-03-19 DIAGNOSIS — K08.89 TOOTHACHE: Primary | ICD-10-CM

## 2022-03-19 PROCEDURE — 99284 EMERGENCY DEPT VISIT MOD MDM: CPT | Performed by: PHYSICIAN ASSISTANT

## 2022-03-19 PROCEDURE — 99282 EMERGENCY DEPT VISIT SF MDM: CPT

## 2022-03-19 RX ORDER — PENICILLIN V POTASSIUM 500 MG/1
500 TABLET ORAL 4 TIMES DAILY
Qty: 40 TABLET | Refills: 0 | Status: SHIPPED | OUTPATIENT
Start: 2022-03-19 | End: 2022-03-29

## 2022-03-19 RX ORDER — NAPROXEN 500 MG/1
500 TABLET ORAL 2 TIMES DAILY WITH MEALS
Qty: 14 TABLET | Refills: 0 | Status: SHIPPED | OUTPATIENT
Start: 2022-03-19 | End: 2022-03-26

## 2022-03-19 NOTE — DISCHARGE INSTRUCTIONS
Here is a list of  dental clinics that may be able to help you  Keep in mind that these clinics do not have to see you or any other patient  Also, these clinics are not connected to the [facility] or [address] [place] [address] system but if they agree to see you as a patient it is easy for them to call  Medical Records Department to have your records faxed to them  [facility]:  [address]   [address] [facility]   [id number]:   [address]   [address] [facility], [address] [facility]   [phone]     [facility]:  [address] [facility], [address] [facility]  [phone]    [address]:  [address] [place], [address]  [phone]    [address]:  [address] [facility] [address]  ([place]:  [address] [facility], [address] [facility]  [phone]    [facility]:  [address] [facility], [address] [facility]  [phone]    [address]:  [address] [facility] [address]  [phone]    [address]:  [address]  [address]  [phone] [address]  [address]   [phone]    [address] [facility] [address]:  [address] [place] [address]  21  Associates:  [address]  [address]  [phone]

## 2022-03-19 NOTE — ED PROVIDER NOTES
History  Chief Complaint   Patient presents with    Dental Pain     reports tooth ache and took amoxacillin from previous visit  took tylenol 2 hrs ago and before that ibuprofen with brief relief  The patient is a 51year old male, who presents to the Ed with the c/o dental pain  He states he noticed increased pain over the left side of his mouth days ago  States that he he may injured a 2 when he was eating chicken ECC to call dentist but was unable to schedule appointment for the next 2 weeks  States the pain is getting worse  He has some known cavities  He denies any fevers chills difficulty swallowing or pus-like drainage  , facial swelling  Patient did take 2 doses an old prescription of amoxicillin that he had from a previous visit  Dental Pain  Location:  Generalized (left side upper and lower)  Quality:  Sharp and shooting  Severity:  Moderate  Onset quality:  Unable to specify  Timing:  Constant  Progression:  Unchanged  Chronicity:  New  Context: dental caries and trauma    Previous work-up:  Dental exam  Relieved by:  Nothing  Worsened by:  Jaw movement, pressure and touching  Ineffective treatments:  Acetaminophen  Associated symptoms: no difficulty swallowing, no facial pain, no facial swelling, no fever, no neck pain, no neck swelling and no trismus    Risk factors: periodontal disease        None       Past Medical History:   Diagnosis Date    Asthma     Kidney stones     Migraines        History reviewed  No pertinent surgical history  History reviewed  No pertinent family history  I have reviewed and agree with the history as documented      E-Cigarette/Vaping    E-Cigarette Use Never User      E-Cigarette/Vaping Substances     Social History     Tobacco Use    Smoking status: Never Smoker    Smokeless tobacco: Never Used   Vaping Use    Vaping Use: Never used   Substance Use Topics    Alcohol use: Not Currently    Drug use: Not Currently       Review of Systems Constitutional: Negative for fever  HENT: Negative for facial swelling  Musculoskeletal: Negative for neck pain  All other systems reviewed and are negative  Physical Exam  Physical Exam  Vitals and nursing note reviewed  Constitutional:       Appearance: He is well-developed  HENT:      Head: Normocephalic and atraumatic  Mouth/Throat:      Mouth: Mucous membranes are moist       Dentition: Dental tenderness and dental caries present  Pharynx: Oropharynx is clear  Uvula midline  Eyes:      Extraocular Movements: Extraocular movements intact  Conjunctiva/sclera: Conjunctivae normal       Pupils: Pupils are equal, round, and reactive to light  Cardiovascular:      Rate and Rhythm: Normal rate and regular rhythm  Heart sounds: No murmur heard  Pulmonary:      Effort: Pulmonary effort is normal  No respiratory distress  Breath sounds: Normal breath sounds  Abdominal:      Palpations: Abdomen is soft  Tenderness: There is no abdominal tenderness  Musculoskeletal:      Cervical back: Neck supple  Skin:     General: Skin is warm and dry  Capillary Refill: Capillary refill takes less than 2 seconds  Neurological:      General: No focal deficit present  Mental Status: He is alert and oriented to person, place, and time           Vital Signs  ED Triage Vitals [03/19/22 1345]   Temperature Pulse Respirations Blood Pressure SpO2   98 1 °F (36 7 °C) 72 16 127/94 95 %      Temp Source Heart Rate Source Patient Position - Orthostatic VS BP Location FiO2 (%)   Temporal Monitor Sitting Right arm --      Pain Score       10 - Worst Possible Pain           Vitals:    03/19/22 1345   BP: 127/94   Pulse: 72   Patient Position - Orthostatic VS: Sitting         Visual Acuity      ED Medications  Medications - No data to display    Diagnostic Studies  Results Reviewed     None                 No orders to display              Procedures  Procedures         ED Course                                             MDM  Number of Diagnoses or Management Options     Amount and/or Complexity of Data Reviewed  Decide to obtain previous medical records or to obtain history from someone other than the patient: yes  Review and summarize past medical records: yes  Independent visualization of images, tracings, or specimens: yes    Risk of Complications, Morbidity, and/or Mortality  Presenting problems: low  Diagnostic procedures: low  Management options: low    Patient Progress  Patient progress: stable      Disposition  Final diagnoses:   Toothache     Time reflects when diagnosis was documented in both MDM as applicable and the Disposition within this note     Time User Action Codes Description Comment    3/19/2022  1:51 PM Primus Stable Add [K08 89] Pain, dental     3/19/2022  1:51 PM Primus Stable Remove [K08 89] Pain, dental     3/19/2022  1:51 PM Primus Stable Add Luv Munch Toothache       ED Disposition     ED Disposition Condition Date/Time Comment    Discharge Stable Sat Mar 19, 2022  1:51  [address] discharge to home/self care  Follow-up Information    None         Patient's Medications   Discharge Prescriptions    NAPROXEN (NAPROSYN) 500 MG TABLET    Take 1 tablet (500 mg total) by mouth 2 (two) times a day with meals for 7 days       Start Date: 3/19/2022 End Date: 3/26/2022       Order Dose: 500 mg       Quantity: 14 tablet    Refills: 0    PENICILLIN V POTASSIUM (VEETID) 500 MG TABLET    Take 1 tablet (500 mg total) by mouth 4 (four) times a day for 10 days       Start Date: 3/19/2022 End Date: 3/29/2022       Order Dose: 500 mg       Quantity: 40 tablet    Refills: 0       No discharge procedures on file      PDMP Review     None          ED Provider  Electronically Signed by           Patrick Denny PA-C  03/19/22 1400

## 2022-03-24 ENCOUNTER — OFFICE VISIT (OUTPATIENT)
Dept: NEUROLOGY | Facility: CLINIC | Age: 47
End: 2022-03-24
Payer: COMMERCIAL

## 2022-03-24 VITALS
DIASTOLIC BLOOD PRESSURE: 70 MMHG | SYSTOLIC BLOOD PRESSURE: 138 MMHG | BODY MASS INDEX: 34.5 KG/M2 | HEIGHT: 64 IN | WEIGHT: 202.1 LBS | HEART RATE: 76 BPM
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
## 2022-03-24 DIAGNOSIS — R20.2 PARESTHESIA: ICD-10-CM

## 2022-03-24 DIAGNOSIS — M79.18 MYOFASCIAL PAIN SYNDROME, CERVICAL: ICD-10-CM

## 2022-03-24 DIAGNOSIS — M54.2 NECK PAIN ON RIGHT SIDE: ICD-10-CM

## 2022-03-24 DIAGNOSIS — H53.19 SCINTILLATING SCOTOMA: ICD-10-CM

## 2022-03-24 DIAGNOSIS — G43.109 MIGRAINE WITH AURA AND WITHOUT STATUS MIGRAINOSUS, NOT INTRACTABLE: Primary | ICD-10-CM

## 2022-03-24 PROCEDURE — 99214 OFFICE O/P EST MOD 30 MIN: CPT | Performed by: PHYSICIAN ASSISTANT

## 2022-03-24 NOTE — PROGRESS NOTES
Patient ID: Clair Villareal is a 51 y o  male  Assessment/Plan:     Diagnoses and all orders for this visit:    Migraine with aura and without status migrainosus, not intractable  -     Ambulatory Referral to Ophthalmology; Future    Work related injury    Myofascial pain syndrome, cervical    Scintillating scotoma  -     Ambulatory Referral to Ophthalmology; Future    Paresthesia  -     Cancel: MRI cervical spine wo contrast; Future    Neck pain on right side  -     Cancel: MRI cervical spine wo contrast; Future           The patient states he has had headaches ever since a work related injury last year, affecting the neck and head  He has a neck MRI but I do not have the results at this time  I will try to obtain the report  Recent CTA head and neck and head CT are unremarkable  Defer brain MRI at this time since he has not had recurrent visual scotomas, likely migraine related  No focal exam today  The patient should contact me if he develops these again  We discussed several conservative therapies for migraine prevention including supplementation:  Magnesium and riboflavin, acupuncture, massage, Etc  The patient would like to continue p r n  Regimen of over-the-counter medication, but advised to not use more than 2-3 doses of any analgesic per week to prevent medication overuse headache  If needed he can try amitriptyline for myofascial pain and headache prevention, as well as muscle relaxants p r n  Romy Serum At the very least ophthalmology evaluation was recommended given scotomas  The patient should not hesitate to call me prior to his follow up with any questions or concerns  Subjective:    HPI    Mr Clair Villareal is a very pleasant 59-year-old male who is here for hospital follow-up  He works for the [employer] for [employer]  He does computer work  He states he has a history of neck injury while picking up heavy packages at his job, this occurred last June  He has had headaches since this injury  States he was referred to PT by his   The patient presented to the ED, [address], on 12/1/2021 and seen by Neurology in tele consultation for visual changes, and to rule out stroke  He was not given tPA since the symptoms were thought to be migraine related  At the time of presentation the blood pressure was recorded to be 119/81, pulse is 78, no fever, no focal deficits on exam, NIHSS score of 0  At the time ED visit head CT and CTA head and neck were both unremarkable  Recent blood work 1/9/2022 reveals CRP of 11 5, CK-MB normal, AST 56, ALT 88  The patient further describes scotomas in the right visual field, and colorful in his periphery, lasting about 20-30 minutes  It occurred while he was waiting at a drive-through at [employer]  He did not have any 2 thing to eat for a while so he thinks maybe this is a trigger  The vision progressively got worse and he decided to go to the ED  He states he was sensitive to the red lights from the car in front of him  This may have also been a trigger  He states he has a sister with migraines as well  He follows with a chiropractor for his low back pain  The patient states he had a cervical spine MRI 7/7/2021 at in imaging facility  He will try to obtain results  Per inpatient Neurology who saw the pt in ED on 12/1/2021:  He presents with visual disturbance  Patient reports waiting in line at [employer] for a [employer], when he noticed sudden onset blurry vision and squiggly lines starting in the periphery of his right visual field  This made nervous so he left and and came to the emergency room  Over the next several minutes these squiggly lines slowly moved from a right to left although never quite made it to midline  They were predominantly in the superior quadrant  Is not sure if they were colorful or monochromatic   Patient denies any numbness, tingling, weakness, heaviness, or clumsiness of a limb,change in vision, hearing, or speech difficulties  He does admit to migraine type headaches that occur couple times a month and usually respond to over-the-counter Excedrin migraine  His migraines usually involved unilateral throbbing with occasional photophobia and occasional nausea  Occasionally there disabling  This is the 1st time he has experienced fortifications  "     The following portions of the patient's history were reviewed and updated as appropriate:   He  has a past medical history of Asthma, Kidney stones, and Migraines  He   Patient Active Problem List    Diagnosis Date Noted    Work related injury 03/24/2022    Myofascial pain syndrome, cervical 03/24/2022    Migraine with aura and without status migrainosus, not intractable 03/24/2022     He  has a past surgical history that includes Kidney stone surgery  His family history includes Lung cancer in his father and mother  He  reports that he has never smoked  He has never used smokeless tobacco  He reports previous alcohol use  He reports previous drug use  Current Outpatient Medications   Medication Sig Dispense Refill    naproxen (NAPROSYN) 500 mg tablet Take 1 tablet (500 mg total) by mouth 2 (two) times a day with meals for 7 days (Patient taking differently: Take 500 mg by mouth if needed  ) 14 tablet 0     No current facility-administered medications for this visit  He is allergic to other            Objective:    Blood pressure 138/70, pulse 76, height 5' 4" (1 626 m), weight 91 7 kg (202 lb 1 6 oz)  Body mass index is 34 69 kg/m²  Physical Exam    Neurological Exam  On neurologic exam, the patient is alert and oriented to time and place  Speech is fluent and articulate, and the patient follows commands appropriately  Judgment and affect appear normal  Pupils are equally round and reactive to light and extraocular muscles are intact without nystagmus   Face is symmetric, and tongue, uvula, and palate are midline  Hearing is intact  Motor examination reveals intact strength throughout  Normal gait is steady  ROS:    Review of Systems   Constitutional: Negative  HENT: Negative  Eyes: Negative  Respiratory: Negative  Cardiovascular: Negative  Gastrointestinal: Positive for nausea  Endocrine: Negative  Genitourinary: Negative  Musculoskeletal: Negative  Skin: Negative  Allergic/Immunologic: Negative  Neurological: Positive for headaches  Hematological: Negative  Psychiatric/Behavioral: Negative  The following portions of the patient's history were reviewed and updated as appropriate: allergies, current medications/ medication history, past family history, past medical history, past social history, past surgical history and problem list     Review of systems was reviewed and otherwise unremarkable from a neurological perspective

## 2022-03-24 NOTE — PATIENT INSTRUCTIONS
Amitriptyline- for headaches, migraine prevention, neck/ back pain  Flexeril, baclofen- muscle spasm

## 2022-10-06 ENCOUNTER — HOSPITAL ENCOUNTER (EMERGENCY)
Facility: HOSPITAL | Age: 47
Discharge: HOME | End: 2022-10-06
Attending: EMERGENCY MEDICINE
Payer: COMMERCIAL

## 2022-10-06 ENCOUNTER — APPOINTMENT (EMERGENCY)
Dept: CT IMAGING | Facility: HOSPITAL | Age: 47
End: 2022-10-06
Payer: COMMERCIAL

## 2022-10-06 VITALS
BODY MASS INDEX: 35.43 KG/M2 | HEART RATE: 78 BPM | DIASTOLIC BLOOD PRESSURE: 61 MMHG | RESPIRATION RATE: 18 BRPM | OXYGEN SATURATION: 96 % | WEIGHT: 206.4 LBS | TEMPERATURE: 96.4 F | SYSTOLIC BLOOD PRESSURE: 108 MMHG

## 2022-10-06 DIAGNOSIS — G43.109 OCULAR MIGRAINE: Primary | ICD-10-CM

## 2022-10-06 LAB
ALBUMIN SERPL BCP-MCNC: 3.6 G/DL (ref 3.5–5)
ALP SERPL-CCNC: 87 U/L (ref 46–116)
ALT SERPL W P-5'-P-CCNC: 51 U/L (ref 12–78)
ANION GAP SERPL CALCULATED.3IONS-SCNC: 9 MMOL/L (ref 4–13)
AST SERPL W P-5'-P-CCNC: 30 U/L (ref 5–45)
BASOPHILS # BLD AUTO: 0.05 THOUSANDS/ÂΜL (ref 0–0.1)
BASOPHILS NFR BLD AUTO: 1 % (ref 0–1)
BILIRUB SERPL-MCNC: 0.58 MG/DL (ref 0.2–1)
BUN SERPL-MCNC: 12 MG/DL (ref 5–25)
CALCIUM SERPL-MCNC: 8.6 MG/DL (ref 8.3–10.1)
CHLORIDE SERPL-SCNC: 103 MMOL/L (ref 96–108)
CO2 SERPL-SCNC: 26 MMOL/L (ref 21–32)
CREAT SERPL-MCNC: 0.92 MG/DL (ref 0.6–1.3)
EOSINOPHIL # BLD AUTO: 0.12 THOUSAND/ÂΜL (ref 0–0.61)
EOSINOPHIL NFR BLD AUTO: 2 % (ref 0–6)
ERYTHROCYTE [DISTWIDTH] IN BLOOD BY AUTOMATED COUNT: 13.1 % (ref 11.6–15.1)
GFR SERPL CREATININE-BSD FRML MDRD: 98 ML/MIN/1.73SQ M
GLUCOSE SERPL-MCNC: 112 MG/DL (ref 65–140)
HCT VFR BLD AUTO: 41.4 % (ref 36.5–49.3)
HGB BLD-MCNC: 14 G/DL (ref 12–17)
IMM GRANULOCYTES # BLD AUTO: 0.01 THOUSAND/UL (ref 0–0.2)
IMM GRANULOCYTES NFR BLD AUTO: 0 % (ref 0–2)
LYMPHOCYTES # BLD AUTO: 1.54 THOUSANDS/ÂΜL (ref 0.6–4.47)
LYMPHOCYTES NFR BLD AUTO: 30 % (ref 14–44)
MCH RBC QN AUTO: 28.7 PG (ref 26.8–34.3)
MCHC RBC AUTO-ENTMCNC: 33.8 G/DL (ref 31.4–37.4)
MCV RBC AUTO: 85 FL (ref 82–98)
MONOCYTES # BLD AUTO: 0.39 THOUSAND/ÂΜL (ref 0.17–1.22)
MONOCYTES NFR BLD AUTO: 8 % (ref 4–12)
NEUTROPHILS # BLD AUTO: 2.99 THOUSANDS/ÂΜL (ref 1.85–7.62)
NEUTS SEG NFR BLD AUTO: 59 % (ref 43–75)
NRBC BLD AUTO-RTO: 0 /100 WBCS
PLATELET # BLD AUTO: 185 THOUSANDS/UL (ref 149–390)
PMV BLD AUTO: 10.6 FL (ref 8.9–12.7)
POTASSIUM SERPL-SCNC: 3.8 MMOL/L (ref 3.5–5.3)
PROT SERPL-MCNC: 7.4 G/DL (ref 6.4–8.4)
RBC # BLD AUTO: 4.87 MILLION/UL (ref 3.88–5.62)
SODIUM SERPL-SCNC: 138 MMOL/L (ref 135–147)
WBC # BLD AUTO: 5.1 THOUSAND/UL (ref 4.31–10.16)

## 2022-10-06 PROCEDURE — 70450 CT HEAD/BRAIN W/O DYE: CPT

## 2022-10-06 PROCEDURE — 96375 TX/PRO/DX INJ NEW DRUG ADDON: CPT

## 2022-10-06 PROCEDURE — 99284 EMERGENCY DEPT VISIT MOD MDM: CPT

## 2022-10-06 PROCEDURE — 36415 COLL VENOUS BLD VENIPUNCTURE: CPT | Performed by: EMERGENCY MEDICINE

## 2022-10-06 PROCEDURE — G1004 CDSM NDSC: HCPCS

## 2022-10-06 PROCEDURE — 96374 THER/PROPH/DIAG INJ IV PUSH: CPT

## 2022-10-06 PROCEDURE — 99284 EMERGENCY DEPT VISIT MOD MDM: CPT | Performed by: EMERGENCY MEDICINE

## 2022-10-06 PROCEDURE — 85025 COMPLETE CBC W/AUTO DIFF WBC: CPT | Performed by: EMERGENCY MEDICINE

## 2022-10-06 PROCEDURE — 80053 COMPREHEN METABOLIC PANEL: CPT | Performed by: EMERGENCY MEDICINE

## 2022-10-06 RX ORDER — KETOROLAC TROMETHAMINE 30 MG/ML
15 INJECTION, SOLUTION INTRAMUSCULAR; INTRAVENOUS ONCE
Status: COMPLETED | OUTPATIENT
Start: 2022-10-06 | End: 2022-10-06

## 2022-10-06 RX ORDER — METOCLOPRAMIDE HYDROCHLORIDE 5 MG/ML
10 INJECTION INTRAMUSCULAR; INTRAVENOUS ONCE
Status: COMPLETED | OUTPATIENT
Start: 2022-10-06 | End: 2022-10-06

## 2022-10-06 RX ADMIN — KETOROLAC TROMETHAMINE 15 MG: 30 INJECTION, SOLUTION INTRAMUSCULAR at 08:30

## 2022-10-06 RX ADMIN — METOCLOPRAMIDE 10 MG: 5 INJECTION, SOLUTION INTRAMUSCULAR; INTRAVENOUS at 08:30

## 2022-10-06 NOTE — ED PROVIDER NOTES
History  Chief Complaint   Patient presents with    Eye Problem     Patient was seen here in the past for like floaters, this am when he woke up he looked at his phone and saw "zig zags" currently vision is on and denies vision problems, just a little bit of a headache  Patient states that this morning when he woke up at 7:00 a m  He noticed his phone screen did look zigzag in nature  Last approximately 30 minutes and resolved  Now has a headache  Does have a history of ocular migraines  Nothing taken prior to arrival   No new trauma  No fevers or chills  No nausea vomiting or diarrhea  History provided by:  Patient  Eye Problem  Location:  Both eyes  Quality: Vision changes  Zigzag vision  Severity:  Mild  Onset quality:  Sudden  Duration:  30 minutes  Timing:  Constant  Progression:  Resolved  Context: not burn, not chemical exposure and not foreign body    Relieved by:  Nothing  Worsened by:  Nothing  Ineffective treatments:  None tried  Associated symptoms: blurred vision and headaches    Associated symptoms: no discharge, no itching, no nausea, no numbness, no tearing, no vomiting and no weakness        Prior to Admission Medications   Prescriptions Last Dose Informant Patient Reported? Taking?   naproxen (NAPROSYN) 500 mg tablet   No No   Sig: Take 1 tablet (500 mg total) by mouth 2 (two) times a day with meals for 7 days   Patient taking differently: Take 500 mg by mouth if needed        Facility-Administered Medications: None       Past Medical History:   Diagnosis Date    Asthma     Kidney stones     Migraines        Past Surgical History:   Procedure Laterality Date    KIDNEY STONE SURGERY         Family History   Problem Relation Age of Onset    Lung cancer Mother     Lung cancer Father      I have reviewed and agree with the history as documented      E-Cigarette/Vaping    E-Cigarette Use Never User      E-Cigarette/Vaping Substances     Social History     Tobacco Use    Smoking status: Never Smoker    Smokeless tobacco: Never Used   Vaping Use    Vaping Use: Never used   Substance Use Topics    Alcohol use: Not Currently    Drug use: Not Currently       Review of Systems   Constitutional: Negative for chills and fever  HENT: Negative for ear pain, hearing loss, sore throat, trouble swallowing and voice change  Eyes: Positive for blurred vision and visual disturbance  Negative for pain, discharge and itching  Respiratory: Negative for cough, shortness of breath and wheezing  Cardiovascular: Negative for chest pain and palpitations  Gastrointestinal: Negative for abdominal pain, blood in stool, constipation, diarrhea, nausea and vomiting  Genitourinary: Negative for dysuria, flank pain, frequency and hematuria  Musculoskeletal: Negative for joint swelling, neck pain and neck stiffness  Skin: Negative for rash and wound  Neurological: Positive for headaches  Negative for dizziness, seizures, syncope, facial asymmetry, weakness and numbness  Psychiatric/Behavioral: Negative for hallucinations, self-injury and suicidal ideas  All other systems reviewed and are negative  Physical Exam  Physical Exam  Vitals and nursing note reviewed  Constitutional:       General: He is not in acute distress  Appearance: He is well-developed  HENT:      Head: Normocephalic and atraumatic  Right Ear: External ear normal       Left Ear: External ear normal    Eyes:      General: No scleral icterus  Right eye: No discharge  Left eye: No discharge  Extraocular Movements: Extraocular movements intact  Conjunctiva/sclera: Conjunctivae normal    Cardiovascular:      Rate and Rhythm: Normal rate and regular rhythm  Heart sounds: Normal heart sounds  No murmur heard  Pulmonary:      Effort: Pulmonary effort is normal       Breath sounds: Normal breath sounds  No wheezing or rales     Abdominal:      General: Bowel sounds are normal  There is no distension  Palpations: Abdomen is soft  Tenderness: There is no abdominal tenderness  There is no guarding or rebound  Musculoskeletal:         General: No deformity  Normal range of motion  Cervical back: Normal range of motion and neck supple  Skin:     General: Skin is warm and dry  Findings: No rash  Neurological:      General: No focal deficit present  Mental Status: He is alert and oriented to person, place, and time  Cranial Nerves: No cranial nerve deficit  Psychiatric:         Mood and Affect: Mood normal          Behavior: Behavior normal          Thought Content: Thought content normal          Judgment: Judgment normal          Vital Signs  ED Triage Vitals [10/06/22 0805]   Temperature Pulse Respirations Blood Pressure SpO2   (!) 96 4 °F (35 8 °C) 75 18 128/86 94 %      Temp Source Heart Rate Source Patient Position - Orthostatic VS BP Location FiO2 (%)   Temporal Monitor Sitting Right arm --      Pain Score       3           Vitals:    10/06/22 0805 10/06/22 0845   BP: 128/86 105/69   Pulse: 75 77   Patient Position - Orthostatic VS: Sitting          Visual Acuity  Visual Acuity    Flowsheet Row Most Recent Value   Visual acuity R eye is 20/40   Visual acuity Left eye is 20/30   Wearing corrective eyewear/lenses?  No          ED Medications  Medications   ketorolac (TORADOL) injection 15 mg (15 mg Intravenous Given 10/6/22 0830)   metoclopramide (REGLAN) injection 10 mg (10 mg Intravenous Given 10/6/22 0830)       Diagnostic Studies  Results Reviewed     Procedure Component Value Units Date/Time    Comprehensive metabolic panel [[id number]] Collected: 10/06/22 0828    Lab Status: Final result Specimen: Blood from Line, Venous Updated: 10/06/22 0859     Sodium 138 mmol/L      Potassium 3 8 mmol/L      Chloride 103 mmol/L      CO2 26 mmol/L      ANION GAP 9 mmol/L      BUN 12 mg/dL      Creatinine 0 92 mg/dL      Glucose 112 mg/dL      Calcium 8 6 mg/dL      AST 30 U/L      ALT 51 U/L      Alkaline Phosphatase 87 U/L      Total Protein 7 4 g/dL      Albumin 3 6 g/dL      Total Bilirubin 0 58 mg/dL      eGFR 98 ml/min/1 73sq m     Narrative:      [place] guidelines for Chronic Kidney Disease (CKD):     Stage 1 with normal or high GFR (GFR > 90 mL/min/1 73 square meters)    Stage 2 Mild CKD (GFR = 60-89 mL/min/1 73 square meters)    Stage 3A Moderate CKD (GFR = 45-59 mL/min/1 73 square meters)    Stage 3B Moderate CKD (GFR = 30-44 mL/min/1 73 square meters)    Stage 4 Severe CKD (GFR = 15-29 mL/min/1 73 square meters)    Stage 5 End Stage CKD (GFR <15 mL/min/1 73 square meters)  Note: GFR calculation is accurate only with a steady state creatinine    CBC and differential [[id number]] Collected: 10/06/22 0828    Lab Status: Final result Specimen: Blood from Line, Venous Updated: 10/06/22 0841     WBC 5 10 Thousand/uL      RBC 4 87 Million/uL      Hemoglobin 14 0 g/dL      Hematocrit 41 4 %      MCV 85 fL      MCH 28 7 pg      MCHC 33 8 g/dL      RDW 13 1 %      MPV 10 6 fL      Platelets 738 Thousands/uL      nRBC 0 /100 WBCs      Neutrophils Relative 59 %      Immat GRANS % 0 %      Lymphocytes Relative 30 %      Monocytes Relative 8 %      Eosinophils Relative 2 %      Basophils Relative 1 %      Neutrophils Absolute 2 99 Thousands/µL      Immature Grans Absolute 0 01 Thousand/uL      Lymphocytes Absolute 1 54 Thousands/µL      Monocytes Absolute 0 39 Thousand/µL      Eosinophils Absolute 0 12 Thousand/µL      Basophils Absolute 0 05 Thousands/µL                  CT head without contrast   Final Result by Shawn Chaves MD (10/06 6557)      No acute intracranial abnormality  Workstation performed: [id number]                    Procedures  Procedures         ED Course  ED Course as of 10/06/22 0920   u Oct 06, 2022   0917 Patient seen  Feeling better    Neurologic exam is nonfocal                                SBIRT 22yo+ Flowsheet Row Most Recent Value   SBIRT (25 yo +)    In order to provide better care to our patients, we are screening all of our patients for alcohol and drug use  Would it be okay to ask you these screening questions? Yes Filed at: 10/06/2022 8880   Initial Alcohol Screen: US AUDIT-C     1  How often do you have a drink containing alcohol? 0 Filed at: 10/06/2022 0819   2  How many drinks containing alcohol do you have on a typical day you are drinking? 0 Filed at: 10/06/2022 0819   3a  Male UNDER 65: How often do you have five or more drinks on one occasion? 0 Filed at: 10/06/2022 0819   3b  FEMALE Any Age, or MALE 65+: How often do you have 4 or more drinks on one occassion? 0 Filed at: 10/06/2022 0819   Audit-C Score 0 Filed at: 10/06/2022 0363   IRVIN: How many times in the past year have you    Used an illegal drug or used a prescription medication for non-medical reasons? Never Filed at: 10/06/2022 9076                    MDM    Disposition  Final diagnoses:   Ocular migraine     Time reflects when diagnosis was documented in both MDM as applicable and the Disposition within this note     Time User Action Codes Description Comment    10/6/2022  8:44 AM Adebayo White Add [G43 109] Ocular migraine       ED Disposition     ED Disposition   Discharge    Condition   Stable    Date/Time   Thu Oct 6, 2022  8:44 AM    Comment   Ming Pride discharge to home/self care  Follow-up Information     Follow up With Specialties Details Why [address] Acute Pain Service Call in 1 day  [address] [facility] [place]  [phone]            Patient's Medications   Discharge Prescriptions    No medications on file       No discharge procedures on file      PDMP Review     None          ED Provider  Electronically Signed by           Lazarus Christine, MD  10/06/22 [facility] Tone Alejandre MD  10/06/22 8098

## 2022-10-06 NOTE — Clinical Note
Whitluis Mckeon was seen and treated in our emergency department on 10/6/2022  Diagnosis:     Carlitos Reeves  may return to work on return date  He may return on this date: 10/07/2022         If you have any questions or concerns, please don't hesitate to call        Zeinab Xavier MD    ______________________________           _______________          _______________  Hospital Representative                              Date                                Time

## 2022-12-02 ENCOUNTER — HOSPITAL ENCOUNTER (EMERGENCY)
Facility: HOSPITAL | Age: 47
Discharge: HOME | End: 2022-12-02
Attending: EMERGENCY MEDICINE

## 2022-12-02 VITALS
HEART RATE: 73 BPM | RESPIRATION RATE: 18 BRPM | WEIGHT: 190 LBS | TEMPERATURE: 97 F | BODY MASS INDEX: 32.44 KG/M2 | OXYGEN SATURATION: 96 % | SYSTOLIC BLOOD PRESSURE: 124 MMHG | DIASTOLIC BLOOD PRESSURE: 68 MMHG | HEIGHT: 64 IN

## 2022-12-02 DIAGNOSIS — K08.89 PAIN, DENTAL: Primary | ICD-10-CM

## 2022-12-02 RX ORDER — AMOXICILLIN AND CLAVULANATE POTASSIUM 875; 125 MG/1; MG/1
1 TABLET, FILM COATED ORAL ONCE
Status: COMPLETED | OUTPATIENT
Start: 2022-12-02 | End: 2022-12-02

## 2022-12-02 RX ORDER — IBUPROFEN 400 MG/1
800 TABLET ORAL ONCE
Status: COMPLETED | OUTPATIENT
Start: 2022-12-02 | End: 2022-12-02

## 2022-12-02 RX ORDER — AMOXICILLIN AND CLAVULANATE POTASSIUM 875; 125 MG/1; MG/1
1 TABLET, FILM COATED ORAL EVERY 12 HOURS
Qty: 10 TABLET | Refills: 0 | Status: SHIPPED | OUTPATIENT
Start: 2022-12-02 | End: 2022-12-07

## 2022-12-02 RX ADMIN — AMOXICILLIN AND CLAVULANATE POTASSIUM 1 TABLET: 875; 125 TABLET, FILM COATED ORAL at 00:31

## 2022-12-02 RX ADMIN — IBUPROFEN 800 MG: 400 TABLET, FILM COATED ORAL at 00:31

## 2022-12-02 NOTE — DISCHARGE INSTRUCTIONS
You received your first dose of antibiotics in the ED for dental infection  Continue to take the rest of your antibiotics  You can take ibuprofen or Tylenol for pain control at home  You can alternate between the two  Here is a list of  dental clinics that may be able to help you  Keep in mind that these clinics do not have to see you or any other patient  Also, these clinics are not connected to the [facility] or [address] [place] [address] system but if they agree to see you as a patient it is easy for them to call  Medical Records Department to have your records faxed to them  [facility]:  [address]   [address] [facility]   22-85-39-05:   [address] [facility]   77 [facility], [address] [facility]   [phone]     [facility]:  [address] [facility], [address] [facility]  [phone]    [address]:  [address] [place], [address] [facility]  [phone]    [address]:  [address]  [place], [address]  ([place]:  [address] [facility], [address] [facility]  [phone]    [facility]:  [address] [facility], [address] [facility]  [phone]    [address]:  [address] [facility] 74  [phone]    [address]:  [address]  [place] [id number] [facility]  [phone] [address] [facility]  [address]   [address]   [phone]    [address] [place] [address] Street:  [address] [place] [address], [address]  21  Associates:  [address]  [facility], [address] [place] [address],[address]  [phone]

## 2022-12-02 NOTE — ED PROVIDER NOTES
History  Chief Complaint   Patient presents with   • Dental Pain     C/o left upper and lower dental pain x2 days      HPI   47F presenting with left lower dental pain  For the past 2 days, patient has been dental pain  He woke up from sleep with the pain  He took Tylenol arthritis which improved his pain  Has had dental infections in the past  Denies fever, odynophagia, neck pain, facial swelling, or shortness of breath  Prior to Admission Medications   Prescriptions Last Dose Informant Patient Reported? Taking?   naproxen (NAPROSYN) 500 mg tablet   No No   Sig: Take 1 tablet (500 mg total) by mouth 2 (two) times a day with meals for 7 days   Patient taking differently: Take 500 mg by mouth if needed        Facility-Administered Medications: None       Past Medical History:   Diagnosis Date   • Asthma    • Kidney stones    • Migraines        Past Surgical History:   Procedure Laterality Date   • KIDNEY STONE SURGERY         Family History   Problem Relation Age of Onset   • Lung cancer Mother    • Lung cancer Father      I have reviewed and agree with the history as documented  E-Cigarette/Vaping   • E-Cigarette Use Never User      E-Cigarette/Vaping Substances     Social History     Tobacco Use   • Smoking status: Never   • Smokeless tobacco: Never   Vaping Use   • Vaping Use: Never used   Substance Use Topics   • Alcohol use: Not Currently   • Drug use: Not Currently       Review of Systems   Constitutional: Negative for chills and fever  HENT: Positive for dental problem  Negative for ear pain and sore throat  Eyes: Negative for pain and visual disturbance  Respiratory: Negative for cough and shortness of breath  Cardiovascular: Negative for chest pain and palpitations  Gastrointestinal: Negative for abdominal pain and vomiting  Genitourinary: Negative for dysuria and hematuria  Musculoskeletal: Negative for arthralgias and back pain  Skin: Negative for color change and rash  Neurological: Negative for seizures and syncope  All other systems reviewed and are negative  Physical Exam  Physical Exam  Vitals and nursing note reviewed  Constitutional:       Appearance: He is well-developed  HENT:      Head: Normocephalic and atraumatic  Right Ear: External ear normal       Left Ear: External ear normal       Nose: Nose normal       Mouth/Throat:      Dentition: Abnormal dentition  Dental tenderness and dental caries present  Tongue: No lesions  Pharynx: Oropharynx is clear  Uvula midline  Eyes:      Conjunctiva/sclera: Conjunctivae normal    Cardiovascular:      Rate and Rhythm: Normal rate and regular rhythm  Pulmonary:      Effort: Pulmonary effort is normal  No respiratory distress  Breath sounds: Normal breath sounds  Abdominal:      Palpations: Abdomen is soft  Tenderness: There is no abdominal tenderness  Musculoskeletal:      Cervical back: Normal range of motion and neck supple  Skin:     General: Skin is warm and dry  Neurological:      General: No focal deficit present  Mental Status: He is alert  Mental status is at baseline  Vital Signs  ED Triage Vitals [12/02/22 0010]   Temperature Pulse Respirations Blood Pressure SpO2   (!) 97 °F (36 1 °C) 73 18 124/68 96 %      Temp Source Heart Rate Source Patient Position - Orthostatic VS BP Location FiO2 (%)   Temporal Monitor Sitting Right arm --      Pain Score       7           Vitals:    12/02/22 0010   BP: 124/68   Pulse: 73   Patient Position - Orthostatic VS: Sitting     Visual Acuity    ED Medications  Medications   ibuprofen (MOTRIN) tablet 800 mg (800 mg Oral Given 12/2/22 0031)   amoxicillin-clavulanate (AUGMENTIN) 875-125 mg per tablet 1 tablet (1 tablet Oral Given 12/2/22 0031)       Diagnostic Studies  Results Reviewed     None             Procedures  Procedures     ED Course     MDM    47M presenting with left lower dental pain over 2nd premolar   No stridor, facial swelling, shortness of breath, neck pain  Augmentin given for dental infection  Contact information for dental clinics were provided  Advised OTC medications for symptomatic management  Patient discharged in stable condition  Disposition  Final diagnoses:   Pain, dental     Time reflects when diagnosis was documented in both MDM as applicable and the Disposition within this note     Time User Action Codes Description Comment    12/2/2022 12:19 AM Wellington Reich [K08 89] Pain, dental       ED Disposition     ED Disposition   Discharge    Condition   Stable    Date/Time   Fri Dec 2, 2022 12:19 AM    Comment   Kyler Pride discharge to home/self care  Follow-up Information    None         Patient's Medications   Discharge Prescriptions    AMOXICILLIN-CLAVULANATE (AUGMENTIN) 875-125 MG PER TABLET    Take 1 tablet by mouth every 12 (twelve) hours for 5 days       Start Date: 12/2/2022 End Date: 12/7/2022       Order Dose: 1 tablet       Quantity: 10 tablet    Refills: 0       No discharge procedures on file      PDMP Review     None          ED Provider  Electronically Signed by           Corinne Situ, MD  12/02/22 9673

## 2022-12-02 NOTE — Clinical Note
Janis Tirado was seen and treated in our emergency department on 12/2/2022  Diagnosis:     Pierce Alicea  may return to work on return date  He may return on this date: 12/03/2022         If you have any questions or concerns, please don't hesitate to call        Audelia Blanc MD    ______________________________           _______________          _______________  Hospital Representative                              Date                                Time

## 2022-12-23 ENCOUNTER — HOSPITAL ENCOUNTER (EMERGENCY)
Facility: HOSPITAL | Age: 47
Discharge: HOME | End: 2022-12-23
Attending: EMERGENCY MEDICINE

## 2022-12-23 VITALS
DIASTOLIC BLOOD PRESSURE: 85 MMHG | OXYGEN SATURATION: 98 % | BODY MASS INDEX: 32.61 KG/M2 | TEMPERATURE: 98 F | HEART RATE: 76 BPM | SYSTOLIC BLOOD PRESSURE: 133 MMHG | WEIGHT: 190 LBS | RESPIRATION RATE: 16 BRPM

## 2022-12-23 DIAGNOSIS — K02.9 DENTAL CARIES: ICD-10-CM

## 2022-12-23 DIAGNOSIS — K04.7 DENTAL INFECTION: ICD-10-CM

## 2022-12-23 DIAGNOSIS — K08.89 PAIN, DENTAL: Primary | ICD-10-CM

## 2022-12-23 RX ORDER — NAPROXEN 500 MG/1
500 TABLET ORAL 2 TIMES DAILY WITH MEALS
Qty: 30 TABLET | Refills: 0 | Status: SHIPPED | OUTPATIENT
Start: 2022-12-23

## 2022-12-23 RX ORDER — AMOXICILLIN AND CLAVULANATE POTASSIUM 875; 125 MG/1; MG/1
1 TABLET, FILM COATED ORAL ONCE
Status: COMPLETED | OUTPATIENT
Start: 2022-12-23 | End: 2022-12-23

## 2022-12-23 RX ORDER — AMOXICILLIN AND CLAVULANATE POTASSIUM 875; 125 MG/1; MG/1
1 TABLET, FILM COATED ORAL EVERY 12 HOURS
Qty: 14 TABLET | Refills: 0 | Status: SHIPPED | OUTPATIENT
Start: 2022-12-23 | End: 2022-12-30

## 2022-12-23 RX ORDER — HYDROCODONE BITARTRATE AND ACETAMINOPHEN 5; 325 MG/1; MG/1
1 TABLET ORAL ONCE
Status: COMPLETED | OUTPATIENT
Start: 2022-12-23 | End: 2022-12-23

## 2022-12-23 RX ADMIN — AMOXICILLIN AND CLAVULANATE POTASSIUM 1 TABLET: 875; 125 TABLET, FILM COATED ORAL at 21:49

## 2022-12-23 RX ADMIN — HYDROCODONE BITARTRATE AND ACETAMINOPHEN 1 TABLET: 5; 325 TABLET ORAL at 21:49

## 2022-12-24 NOTE — ED PROVIDER NOTES
History  Chief Complaint   Patient presents with   • Dental Pain     Pt reports L sided dental pain for three days  States he was seen here this month for similar  Improved with abx, but has not had time for dentistry      Patient is a 23-year-old male presenting to the emergency department complaining of tooth ache, states he has been seen earlier this month for similar tooth pain with dental infection, has been unable to see a dentist since his previous visit on December 2, he did have intermittent improvement of symptoms with antibiotics but pain is worsening once again today, he denies any fevers, no difficulty swallowing or shortness of breath, no drainage          Prior to Admission Medications   Prescriptions Last Dose Informant Patient Reported? Taking?   naproxen (NAPROSYN) 500 mg tablet   No No   Sig: Take 1 tablet (500 mg total) by mouth 2 (two) times a day with meals for 7 days   Patient taking differently: Take 500 mg by mouth if needed        Facility-Administered Medications: None       Past Medical History:   Diagnosis Date   • Asthma    • Kidney stones    • Migraines        Past Surgical History:   Procedure Laterality Date   • KIDNEY STONE SURGERY         Family History   Problem Relation Age of Onset   • Lung cancer Mother    • Lung cancer Father      I have reviewed and agree with the history as documented  E-Cigarette/Vaping   • E-Cigarette Use Never User      E-Cigarette/Vaping Substances     Social History     Tobacco Use   • Smoking status: Never   • Smokeless tobacco: Never   Vaping Use   • Vaping Use: Never used   Substance Use Topics   • Alcohol use: Not Currently   • Drug use: Not Currently       Review of Systems   Constitutional: Negative  HENT: Positive for dental problem  Eyes: Negative  Respiratory: Negative  Cardiovascular: Negative  Gastrointestinal: Negative  Endocrine: Negative  Genitourinary: Negative  Musculoskeletal: Negative  Skin: Negative  Allergic/Immunologic: Negative  Neurological: Negative  Hematological: Negative  Psychiatric/Behavioral: Negative  Physical Exam  Physical Exam  Constitutional:       Appearance: He is well-developed  HENT:      Head: Normocephalic and atraumatic  Mouth/Throat:      Dentition: Gingival swelling and dental caries present  Comments: Left mandibular premolar tooth fractured, surrounding teeth with multiple dental caries, mild gingival erythema and swelling, no fluctuance or drainage, airway patent  Eyes:      Conjunctiva/sclera: Conjunctivae normal       Pupils: Pupils are equal, round, and reactive to light  Cardiovascular:      Rate and Rhythm: Normal rate  Pulmonary:      Effort: Pulmonary effort is normal    Abdominal:      Palpations: Abdomen is soft  Musculoskeletal:         General: Normal range of motion  Cervical back: Normal range of motion and neck supple  Skin:     General: Skin is warm and dry  Neurological:      Mental Status: He is alert and oriented to person, place, and time           Vital Signs  ED Triage Vitals [12/23/22 2132]   Temperature Pulse Respirations Blood Pressure SpO2   98 °F (36 7 °C) 76 16 133/85 98 %      Temp src Heart Rate Source Patient Position - Orthostatic VS BP Location FiO2 (%)   -- -- Lying Right arm --      Pain Score       10 - Worst Possible Pain           Vitals:    12/23/22 2132   BP: 133/85   Pulse: 76   Patient Position - Orthostatic VS: Lying         Visual Acuity      ED Medications  Medications   amoxicillin-clavulanate (AUGMENTIN) 875-125 mg per tablet 1 tablet (has no administration in time range)   HYDROcodone-acetaminophen (NORCO) 5-325 mg per tablet 1 tablet (has no administration in time range)       Diagnostic Studies  Results Reviewed     None                 No orders to display              Procedures  Procedures         ED Course                                             MDM  Number of Diagnoses or Management Options  Dental caries: minor  Dental infection: minor  Pain, dental: minor  Diagnosis management comments: Patient with ongoing dental pain/dental infection, has been unable to see a dentist since his last visit earlier this month, has been seen multiple times for dental pain in the past as well, given prescription for antibiotics and anti-inflammatory pain medication, advised definitive care would be with dentist/oral surgeon, advised close follow-up with PCP as needed or return if symptoms worsen, patient acknowledges understanding and agreement with this plan    Risk of Complications, Morbidity, and/or Mortality  Presenting problems: minimal  Diagnostic procedures: minimal  Management options: minimal        Disposition  Final diagnoses:   Pain, dental   Dental caries   Dental infection     Time reflects when diagnosis was documented in both MDM as applicable and the Disposition within this note     Time User Action Codes Description Comment    12/23/2022  9:39 PM Hazel Lamb Add [K08 89] Pain, dental     12/23/2022  9:39 PM Hazel Ree Add [K02 9] Dental caries     12/23/2022  9:39 PM Zena Ingram Add [K04 7] Dental infection       ED Disposition     ED Disposition   Discharge    Condition   Stable    Date/Time   Fri Dec 23, 2022  9:39 PM    Comment   [address] discharge to home/self care                 Follow-up Information     Follow up With Specialties Details Why [address] [facility]  As needed [address] [address] [address] [facility]            Patient's Medications   Discharge Prescriptions    AMOXICILLIN-CLAVULANATE (AUGMENTIN) 875-125 MG PER TABLET    Take 1 tablet by mouth every 12 (twelve) hours for 7 days       Start Date: 12/23/2022End Date: 12/30/2022       Order Dose: 1 tablet       Quantity: 14 tablet    Refills: 0    NAPROXEN (NAPROSYN) 500 MG TABLET    Take 1 tablet (500 mg total) by mouth 2 (two) times a day with meals Start Date: 12/23/2022End Date: --       Order Dose: 500 mg       Quantity: 30 tablet    Refills: 0       No discharge procedures on file      PDMP Review     None          ED Provider  Electronically Signed by           Pola Bundy DO  12/23/22 2161

## 2022-12-24 NOTE — DISCHARGE INSTRUCTIONS
[address]  [address]  [id number]   [address] [facility]   [address]   [address]   [phone]

## 2023-01-13 ENCOUNTER — OFFICE VISIT (OUTPATIENT)
Dept: NEUROLOGY | Facility: CLINIC | Age: 48
End: 2023-01-13

## 2023-01-13 VITALS
TEMPERATURE: 97.5 F | DIASTOLIC BLOOD PRESSURE: 77 MMHG | WEIGHT: 202.4 LBS | HEART RATE: 72 BPM | BODY MASS INDEX: 34.74 KG/M2 | SYSTOLIC BLOOD PRESSURE: 120 MMHG
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
## 2023-01-13 DIAGNOSIS — M79.18 MYOFASCIAL PAIN SYNDROME, CERVICAL: ICD-10-CM

## 2023-01-13 DIAGNOSIS — R79.82 CRP ELEVATED: ICD-10-CM

## 2023-01-13 DIAGNOSIS — H53.129 SCINTILLATING SCOTOMA: ICD-10-CM

## 2023-01-13 DIAGNOSIS — G43.109 MIGRAINE WITH AURA AND WITHOUT STATUS MIGRAINOSUS, NOT INTRACTABLE: Primary | ICD-10-CM

## 2023-01-13 RX ORDER — DEXAMETHASONE 4 MG/1
TABLET ORAL
COMMUNITY
Start: 2023-01-03

## 2023-01-13 NOTE — PATIENT INSTRUCTIONS
Excedrin for migraine or headache, can take at the aura/ ocular symptoms  Naproxen if excedrin fails  Ice packs, rest in a quiet and dark room    Magnesium for prevention, add riboflavin    Brain MRI    Headache management instructions  - When patient has a moderate to severe headache, they should seek rest, initiate relaxation and apply cold compresses to the head  - Maintain regular sleep schedule  Adults need at least 7-8 hours of uninterrupted a night  - Limit over the counter medications such as Tylenol, Ibuprofen, Aleve, Excedrin  (No more than 2- 3 times a week or max 10 a month)  - Maintain headache diary  Free FRANKIE for a smart phone, which can be used is "Migraine daylin"  - Limit caffeine to 1-2 cups 8 to 16 oz a day or less  - Avoid dietary trigger  (aged cheese, peanuts, MSG, aspartame and nitrates)  - Patient is to have regular frequent meals to prevent headache onset  - Please drink at least 64 ounces of water a day to help remain hydrated

## 2023-01-13 NOTE — LETTER
January 13, 2023     Patient: Katty Julien   YOB: 1975   Date of Visit: 1/13/2023       To Whom It May Concern:    Mr Katty Julien should be excused from work today due to incapacitating migraine headache  He can return on Monday, 1/16/23  If you have any questions or concerns, please don't hesitate to call           Sincerely,        Rommel Levy PA-C    CC: No Recipients

## 2023-01-13 NOTE — PROGRESS NOTES
Patient ID: Solo Cardoza is a 49 y o  male  Assessment/Plan:     Diagnoses and all orders for this visit:    Migraine with aura and without status migrainosus, not intractable  -     MRI brain without contrast; Future  -     Ambulatory Referral to Ophthalmology; Future  -     Lipid panel; Future  -     HEMOGLOBIN A1C W/ EAG ESTIMATION; Future  -     C-reactive protein; Future    Work related injury    Scintillating scotoma  -     MRI brain without contrast; Future  -     Ambulatory Referral to Ophthalmology; Future    Myofascial pain syndrome, cervical    CRP elevated    Other orders  -     dexamethasone (DECADRON) 4 mg tablet; TAKE 2 TABLETS BY MOUTH DAYS 1-3 AND 1 TABLET DAYS 4-6, TAKE IN THE MORNING (Patient not taking: Reported on 1/13/2023)         He continues to experience migraine headaches at least once per week or more, and only partial improvement with Excedrin Migraine  We had a discussion about the over-the-counter supplements such as magnesium and riboflavin which can reduce the migraine frequency  He would like to avoid prescription medications for prevention  He did have a CTA head and neck at the onset of his first migraine which was unremarkable thankfully  Patient needs a more detailed brain MRI at this time since migraines are ongoing and frequent  Labs also ordered  Will try to obtain last EMG, cervical and lumbar imaging which is available  He continues to follow with pain management and surgery  The patient should not hesitate to call me prior to his follow up with any questions or concerns  Subjective:    HPI     Mr Solo Cardoza is here for f/u for ongoing headaches and neck pain  He works for the [email or web address] for [employer]  He does computer work      He states he has a history of neck injury while picking up heavy packages at his job, this occurred last June  He has had headaches since this injury    States he was referred to PT by his       Reports that his migraines are still intermittent  On average she has about 1 migraine per week but sometimes more depending on triggers  He takes Excedrin Migraine with partial improvement    He reports neck pain 5-6/10 on average, can reach 8/10 when severe    He went to the ED again for an "ocular migraine" 10/6/22  HCT wnl  He does continue to have visual disturbances before most of his headaches      Currently doing PT for neck pain  Family history of migraine: Paternal grandmother, sister    He saw a pain specialist outside of [place] [address] for neck pain and was given an epidural injection in the cervical region as well as lumbar region, at 2 separate times  Pain specialist (Dr Danita Bueno) is in [place], and [facility] (Dilcia Torres)  He also saw surgeon for neck and low back pain (he cannot recall if it was a neurosurgeon or orthopedic); Dr Eulalio Stein in [place]  He had CTs and XRs at another facility which we will have to obtain (he thinks [address] [place] [address],[address])  States he had an EMG on all 4 extremities at [facility], Dr Sangeeta Valentin  Prior note: The patient presented to the ED, [address], on 12/1/2021 and seen by Neurology in tele consultation for visual changes, and to rule out stroke  He was not given tPA since the symptoms were thought to be migraine related  At the time of presentation the blood pressure was recorded to be 119/81, pulse is 78, no fever, no focal deficits on exam, NIHSS score of 0      At the time ED visit head CT and CTA head and neck were both unremarkable      Recent blood work 1/9/2022 reveals CRP of 11 5, CK-MB normal, AST 56, ALT 88      The patient further describes scotomas in the right visual field, and colorful in his periphery, lasting about 20-30 minutes  It occurred while he was waiting at a drive-through at [employer]  He did not have any 2 thing to eat for a while so he thinks maybe this is a trigger    The vision progressively got worse and he decided to go to the ED  He states he was sensitive to the red lights from the car in front of him  This may have also been a trigger      He follows with a chiropractor for his low back pain      The patient states he had a cervical spine MRI 7/7/2021 at in imaging facility  He will try to obtain results      Per inpatient Neurology who saw the pt in ED on 12/1/2021:  He “presents with visual disturbance   Patient reports waiting in line at [employer] for a [employer], when he noticed sudden onset blurry vision and squiggly lines starting in the periphery of his right visual field   This made nervous so he left and and came to the emergency room   Over the next several minutes these squiggly lines slowly moved from a right to left although never quite made it to midline   They were predominantly in the superior quadrant   Is not sure if they were colorful or monochromatic  Patient denies any numbness, tingling, weakness, heaviness, or clumsiness of a limb,change in vision, hearing, or speech difficulties        He does admit to migraine type headaches that occur couple times a month and usually respond to over-the-counter Excedrin migraine   His migraines usually involved unilateral throbbing with occasional photophobia and occasional nausea   Occasionally there disabling   This is the 1st time he has experienced fortifications  "      The following portions of the patient's history were reviewed and updated as appropriate:   He  has a past medical history of Asthma, Kidney stones, and Migraines    He   Patient Active Problem List    Diagnosis Date Noted   • CRP elevated 01/23/2023   • Scintillating scotoma 01/13/2023   • Work related injury 03/24/2022   • Myofascial pain syndrome, cervical 03/24/2022   • Migraine with aura and without status migrainosus, not intractable 03/24/2022     He  has a past surgical history that includes Kidney stone surgery and Tooth extraction (12/26/2022)  His family history includes Lung cancer in his father and mother  He  reports that he has never smoked  He has never used smokeless tobacco  He reports that he does not currently use alcohol  He reports that he does not currently use drugs  Current Outpatient Medications   Medication Sig Dispense Refill   • dexamethasone (DECADRON) 4 mg tablet TAKE 2 TABLETS BY MOUTH DAYS 1-3 AND 1 TABLET DAYS 4-6, TAKE IN THE MORNING (Patient not taking: Reported on 1/13/2023)     • naproxen (NAPROSYN) 500 mg tablet Take 1 tablet (500 mg total) by mouth 2 (two) times a day with meals for 7 days (Patient not taking: Reported on 1/13/2023) 14 tablet 0   • naproxen (Naprosyn) 500 mg tablet Take 1 tablet (500 mg total) by mouth 2 (two) times a day with meals (Patient not taking: Reported on 1/13/2023) 30 tablet 0     No current facility-administered medications for this visit  He is allergic to other            Objective:    Blood pressure 120/77, pulse 72, temperature 97 5 °F (36 4 °C), temperature source Temporal, weight 91 8 kg (202 lb 6 4 oz)  Body mass index is 34 74 kg/m²  Physical Exam    Neurological Exam  On neurologic exam, the patient is alert and oriented to time and place  Speech is fluent and articulate, and the patient follows commands appropriately  Judgment and affect appear normal  Pupils are equally round and reactive to light and extraocular muscles are intact without nystagmus  Face is symmetric, and tongue, uvula, and palate are midline  Hearing is intact  Motor examination reveals intact strength throughout  Normal gait is steady  ROS:    Review of Systems   Constitutional: Negative  Negative for appetite change and fever  HENT: Negative  Negative for hearing loss, tinnitus, trouble swallowing and voice change  Eyes: Negative  Negative for photophobia, pain and visual disturbance  Respiratory: Negative  Negative for shortness of breath  Cardiovascular: Negative  Negative for palpitations  Gastrointestinal: Negative  Negative for nausea and vomiting  Endocrine: Negative  Negative for cold intolerance  Genitourinary: Negative  Negative for dysuria, frequency and urgency  Musculoskeletal: Negative  Negative for gait problem, myalgias and neck pain  Skin: Negative  Negative for rash  Allergic/Immunologic: Negative  Neurological: Negative  Negative for dizziness, tremors, seizures, syncope, facial asymmetry, speech difficulty, weakness, light-headedness, numbness and headaches  Hematological: Negative  Does not bruise/bleed easily  Psychiatric/Behavioral: Negative  Negative for confusion, hallucinations and sleep disturbance  The following portions of the patient's history were reviewed and updated as appropriate: allergies, current medications/ medication history, past family history, past medical history, past social history, past surgical history and problem list     Review of systems was reviewed and otherwise unremarkable from a neurological perspective

## 2023-01-23 PROBLEM — R79.82 CRP ELEVATED: Status: ACTIVE | Noted: 2023-01-23

## 2023-02-03 ENCOUNTER — APPOINTMENT (OUTPATIENT)
Dept: LAB | Facility: HOSPITAL | Age: 48
End: 2023-02-03

## 2023-02-03 ENCOUNTER — HOSPITAL ENCOUNTER (OUTPATIENT)
Dept: MRI IMAGING | Facility: HOSPITAL | Age: 48
End: 2023-02-03

## 2023-02-03 DIAGNOSIS — G43.109 MIGRAINE WITH AURA AND WITHOUT STATUS MIGRAINOSUS, NOT INTRACTABLE: ICD-10-CM

## 2023-02-03 DIAGNOSIS — H53.129 SCINTILLATING SCOTOMA: ICD-10-CM

## 2023-02-03 LAB
CHOLEST SERPL-MCNC: 173 MG/DL
CRP SERPL QL: 3.5 MG/L
EST. AVERAGE GLUCOSE BLD GHB EST-MCNC: 108 MG/DL
HBA1C MFR BLD: 5.4 %
HDLC SERPL-MCNC: 38 MG/DL
LDLC SERPL CALC-MCNC: 94 MG/DL (ref 0–100)
NONHDLC SERPL-MCNC: 135 MG/DL
TRIGL SERPL-MCNC: 207 MG/DL

## 2023-02-07 ENCOUNTER — TELEPHONE (OUTPATIENT)
Dept: NEUROLOGY | Facility: CLINIC | Age: 48
End: 2023-02-07

## 2023-02-07 NOTE — TELEPHONE ENCOUNTER
Ronald Mejia has called requesting a call back with lab results and  Brain MRI results  Can you please assist?    Call Back number [phone]      Thank you,     Aileen Hargrove

## 2023-02-07 NOTE — TELEPHONE ENCOUNTER
Patient of Clay Pierce,    MRI now resulted:    IMPRESSION:     No acute infarction, intracranial hemorrhage or mass effect  Please review and provide input, thank you

## 2023-02-10 NOTE — TELEPHONE ENCOUNTER
Reviewed with pt  Slightly high trig- pt understanding to change lifestyle- more aerobic exercise and more fruits/ veg/ limit carbs, fatty foods, etc  Otherwise results look fine  CRP decreased

## 2023-02-15 ENCOUNTER — TELEPHONE (OUTPATIENT)
Dept: NEUROLOGY | Facility: CLINIC | Age: 48
End: 2023-02-15

## 2023-02-15 NOTE — TELEPHONE ENCOUNTER
----- Message from Lois Mazariegos PA-C sent at 1/23/2023 11:53 AM EST -----  The pt had imaging I believe of C spine MRI? At [address] [facility] imaging center and EMG at [address] [facility] physical [facility]  I cannot find them in the chart, can we call to have the results faxed to us?

## 2023-02-15 NOTE — TELEPHONE ENCOUNTER
Spoke with Tate Ellis, asked if he could call [address] imaging [facility] for his C Spine MRI and EMG  Told him he might need to sign a records release to have them sent to us

## 2023-04-19 ENCOUNTER — APPOINTMENT (EMERGENCY)
Dept: CT IMAGING | Facility: HOSPITAL | Age: 48
End: 2023-04-19

## 2023-04-19 ENCOUNTER — APPOINTMENT (EMERGENCY)
Dept: RADIOLOGY | Facility: HOSPITAL | Age: 48
End: 2023-04-19

## 2023-04-19 ENCOUNTER — HOSPITAL ENCOUNTER (EMERGENCY)
Facility: HOSPITAL | Age: 48
Discharge: HOME | End: 2023-04-19
Attending: EMERGENCY MEDICINE | Admitting: EMERGENCY MEDICINE

## 2023-04-19 VITALS
RESPIRATION RATE: 16 BRPM | WEIGHT: 207.01 LBS | DIASTOLIC BLOOD PRESSURE: 79 MMHG | SYSTOLIC BLOOD PRESSURE: 122 MMHG | HEIGHT: 64 IN | OXYGEN SATURATION: 98 % | BODY MASS INDEX: 35.34 KG/M2 | HEART RATE: 66 BPM

## 2023-04-19 DIAGNOSIS — R59.0 LYMPHADENOPATHY, MESENTERIC: ICD-10-CM

## 2023-04-19 DIAGNOSIS — S09.90XA INJURY OF HEAD, INITIAL ENCOUNTER: ICD-10-CM
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
## 2023-04-19 RX ORDER — KETOROLAC TROMETHAMINE 30 MG/ML
15 INJECTION, SOLUTION INTRAMUSCULAR; INTRAVENOUS ONCE
Status: COMPLETED | OUTPATIENT
Start: 2023-04-19 | End: 2023-04-19

## 2023-04-19 RX ADMIN — KETOROLAC TROMETHAMINE 15 MG: 30 INJECTION, SOLUTION INTRAMUSCULAR; INTRAVENOUS at 09:09

## 2023-04-19 NOTE — DISCHARGE INSTRUCTIONS
Your imaging was negative for any acute process,  However, you do need to follow-up with your primary care doctor as radiology would like to repeat the CAT scan of your abdomen with contrast  because they saw some lymph nodes  This is likely incidental but should be followed up  Return with any worsening

## 2023-04-19 NOTE — ED PROVIDER NOTES
History  Chief Complaint   Patient presents with   • Head Injury     Pt states they slid down attic stairs this morning, hit head and back, denies LOC or blood thinner use     41-year-old male to the emergency room complaining of head pain, neck pain, lower back pain, and some chest discomfort  Patient slipped at the top of approximately 12 stairs this morning and fell down them on his back  Denies any loss of consciousness  Has had no nausea vomiting  Denies any unilateral weakness, numbness, or tingling  History provided by:  Patient  Fall  Mechanism of injury: fall    Injury location:  Head/neck and torso  Head/neck injury location:  Head  Torso injury location:  Back  Fall:     Fall occurred:  Down stairs    Point of impact:  Head  Prior to arrival data:     Patient ambulatory at scene: yes      Responsiveness at scene:  Alert    Orientation at scene:  Person, place, situation and time    Loss of consciousness: no      Amnesic to event: no    Associated symptoms: back pain, chest pain and neck pain    Associated symptoms: no abdominal pain, no difficulty breathing, no headaches, no loss of consciousness, no nausea and no vomiting        Prior to Admission Medications   Prescriptions Last Dose Informant Patient Reported?  Taking?   dexamethasone (DECADRON) 4 mg tablet   Yes No   Sig: TAKE 2 TABLETS BY MOUTH DAYS 1-3 AND 1 TABLET DAYS 4-6, TAKE IN THE MORNING   Patient not taking: Reported on 1/13/2023   naproxen (Naprosyn) 500 mg tablet   No No   Sig: Take 1 tablet (500 mg total) by mouth 2 (two) times a day with meals   Patient not taking: Reported on 1/13/2023      Facility-Administered Medications: None       Past Medical History:   Diagnosis Date   • Asthma    • Kidney stones    • Migraines        Past Surgical History:   Procedure Laterality Date   • KIDNEY STONE SURGERY     • TOOTH EXTRACTION  12/26/2022       Family History   Problem Relation Age of Onset   • Lung cancer Mother    • Lung cancer Father      I have reviewed and agree with the history as documented  E-Cigarette/Vaping   • E-Cigarette Use Never User      E-Cigarette/Vaping Substances     Social History     Tobacco Use   • Smoking status: Never   • Smokeless tobacco: Never   Vaping Use   • Vaping Use: Never used   Substance Use Topics   • Alcohol use: Not Currently   • Drug use: Not Currently       Review of Systems   Constitutional: Negative  HENT: Negative  Respiratory: Negative for chest tightness, shortness of breath and wheezing  Cardiovascular: Positive for chest pain  Gastrointestinal: Negative for abdominal pain, nausea and vomiting  Genitourinary: Negative  Musculoskeletal: Positive for back pain and neck pain  Neurological: Negative for loss of consciousness and headaches  All other systems reviewed and are negative  Physical Exam  Physical Exam  Vitals and nursing note reviewed  Constitutional:       General: He is in acute distress  Appearance: Normal appearance  He is well-developed  He is not ill-appearing or toxic-appearing  HENT:      Head: Normocephalic and atraumatic  Hair is normal       Jaw: No pain on movement  Right Ear: External ear normal       Left Ear: External ear normal       Nose: Nose normal  No congestion  Mouth/Throat:      Mouth: Mucous membranes are moist    Eyes:      General: Lids are normal  No scleral icterus  Extraocular Movements: Extraocular movements intact  Conjunctiva/sclera: Conjunctivae normal       Pupils: Pupils are equal, round, and reactive to light  Cardiovascular:      Rate and Rhythm: Normal rate and regular rhythm  Heart sounds: Normal heart sounds  No murmur heard  Pulmonary:      Effort: Pulmonary effort is normal  No respiratory distress  Breath sounds: Normal breath sounds  No decreased breath sounds, wheezing, rhonchi or rales  Abdominal:      General: Abdomen is flat  There is no distension        Palpations: Abdomen is soft  Abdomen is not rigid  Tenderness: There is no abdominal tenderness  There is no guarding or rebound  Musculoskeletal:         General: No swelling, deformity or signs of injury  Normal range of motion  Cervical back: Normal range of motion and neck supple  Tenderness present  No bony tenderness  Normal range of motion  Lumbar back: Tenderness present  No swelling or bony tenderness  Skin:     General: Skin is warm and dry  Coloration: Skin is not pale  Findings: No rash  Neurological:      General: No focal deficit present  Mental Status: He is alert and oriented to person, place, and time  Mental status is at baseline  Psychiatric:         Attention and Perception: Attention normal          Mood and Affect: Mood is anxious  Speech: Speech normal          Behavior: Behavior normal          Vital Signs  ED Triage Vitals [04/19/23 0835]   Temp Pulse Respirations Blood Pressure SpO2   -- 74 20 117/72 98 %      Temp src Heart Rate Source Patient Position - Orthostatic VS BP Location FiO2 (%)   -- Monitor Sitting Right arm --      Pain Score       8           Vitals:    04/19/23 0835 04/19/23 1000 04/19/23 1030 04/19/23 1100   BP: 117/72 117/80 130/81 122/79   Pulse: 74 75 70 66   Patient Position - Orthostatic VS: Sitting   Sitting         Visual Acuity  Visual Acuity    Flowsheet Row Most Recent Value   L Pupil Size (mm) 3   R Pupil Size (mm) 3          ED Medications  Medications   ketorolac (TORADOL) injection 15 mg (15 mg Intramuscular Given 4/19/23 0909)       Diagnostic Studies  Results Reviewed     None                 CT head without contrast   Final Result by Karlene Jean Baptiste MD (04/19 1036)      No acute intracranial process  No skull fracture  Workstation performed: [id number]         CT lumbar spine without contrast   Final Result by Karlene Jean Baptiste MD (04/19 5280)      No acute osseous abnormality  Mild degenerative changes as above  Incidental finding of mildly enlarged left para-aortic lymph node and prominent subcentimeter short axis retroperitoneal lymph nodes  Advise nonemergent follow-up abdomen and pelvis CT with IV and oral contrast       This study demonstrates a finding requiring imaging follow-up and was documented as such in Epic  Workstation performed: [id number]         CT cervical spine without contrast   Final Result by Lidai Das MD (04/19 1044)      No cervical spine fracture or traumatic malalignment  Workstation performed: [id number]         XR chest 1 view portable   Final Result by Gordon Onofre MD (04/19 8356)      No acute cardiopulmonary disease  Workstation performed: [id number]                    Procedures  Procedures         ED Course  ED Course as of 04/19/23 1344   Wed Apr 19, 2023   1002 CXR was negative   1040 CT of the brain is negative for acute process  1101   No acute findings identified on CT of the lumbar spine, cervical spine or head  Incidental finding of lymphadenopathy for which the patient will need outpatient CT imaging  SBIRT 22yo+    Flowsheet Row Most Recent Value   Initial Alcohol Screen: US AUDIT-C     1  How often do you have a drink containing alcohol? 0 Filed at: 04/19/2023 0834   2  How many drinks containing alcohol do you have on a typical day you are drinking? 0 Filed at: 04/19/2023 0834   3a  Male UNDER 65: How often do you have five or more drinks on one occasion? 0 Filed at: 04/19/2023 0834   3b  FEMALE Any Age, or MALE 65+: How often do you have 4 or more drinks on one occassion? 0 Filed at: 04/19/2023 0834   Audit-C Score 0 Filed at: 04/19/2023 6447   IRVIN: How many times in the past year have you    Used an illegal drug or used a prescription medication for non-medical reasons?  Never Filed at: 04/19/2023 7445                    Medical Decision Making  Patient presented to the emergency department and a MSE was performed  The patient was evaluated for complaint related to acute traumatic injury  Patient is potentially at risk for, but not limited to, acute head injury including intracranial hemorrhage, subdural or epidural hematoma, cervical spine injury, other spine injury, chest trauma such as pneumothorax, pulmonary contusion, or cardiac contusion, abdominal injury such as liver hematoma, spleen hematoma, kidney hematoma, or other musculoskeletal injury  Several of these diagnoses have been evaluated and ruled out by history and physical   As needed, patient will be further evaluated with laboratory and imaging studies  Higher level diagnostics, such as CT imaging or ultrasound, may also be required  Please see work-up portion of the note for further evaluation of patient's risk  Socioeconomic factors were also considered as part of the decision-making process  Unless otherwise stated in the chart or patient is admitted as elsewhere documented, any previously prescribed medications will be maintained  Fall, initial encounter: complicated acute illness or injury with systemic symptoms  Injury of head, initial encounter: complicated acute illness or injury  Lymphadenopathy, mesenteric: undiagnosed new problem with uncertain prognosis  Amount and/or Complexity of Data Reviewed  Radiology: ordered  Risk  Prescription drug management  Disposition  Final diagnoses:   Fall, initial encounter   Injury of head, initial encounter   Lymphadenopathy, mesenteric     Time reflects when diagnosis was documented in both MDM as applicable and the Disposition within this note     Time User Action Codes Description Comment    4/19/2023 11:02 AM Ned Arcos  DHYG] DXPM, initial encounter     4/19/2023 11:02 AM Ned Bobo Add [S75 38YK] Injury of head, initial encounter     4/19/2023 11:11 AM Ada Aguilar Add [R59 0] Lymphadenopathy, mesenteric       ED Disposition     ED Disposition   Discharge    Condition   Stable    Date/Time   Wed Apr 19, 2023 11:02 AM    Comment   Zafar Gravesitler discharge to home/self care                 Follow-up Information     Follow up With Specialties Details Why Contact Info    Xavier Ramirez PA-C Physician Assistant, Family Medicine   [address]  [facility]  [phone]            Discharge Medication List as of 4/19/2023 11:06 AM      STOP taking these medications       dexamethasone (DECADRON) 4 mg tablet Comments:   Reason for Stopping:         naproxen (Naprosyn) 500 mg tablet Comments:   Reason for Stopping:                   PDMP Review     None          ED Provider  Electronically Signed by           Maddie Robertson DO  04/19/23 8793

## 2023-04-19 NOTE — Clinical Note
Mayra Jesse was seen and treated in our emergency department on 4/19/2023  Diagnosis:     Harsh Elizabeth  may return to work on return date  He may return on this date: 04/20/2023         If you have any questions or concerns, please don't hesitate to call        Brigida Alarcon DO    ______________________________           _______________          _______________  Hospital Representative                              Date                                Time

## 2023-04-26 ENCOUNTER — OFFICE VISIT (OUTPATIENT)
Dept: FAMILY MEDICINE CLINIC | Facility: CLINIC | Age: 48
End: 2023-04-26

## 2023-04-26 VITALS
HEART RATE: 85 BPM | SYSTOLIC BLOOD PRESSURE: 114 MMHG | WEIGHT: 204.59 LBS | HEIGHT: 64 IN | OXYGEN SATURATION: 96 % | TEMPERATURE: 97.1 F | BODY MASS INDEX: 34.93 KG/M2 | DIASTOLIC BLOOD PRESSURE: 78 MMHG

## 2023-04-26 DIAGNOSIS — M62.830 MUSCLE SPASM OF BACK: ICD-10-CM

## 2023-04-26 DIAGNOSIS — Z11.59 NEED FOR HEPATITIS C SCREENING TEST: ICD-10-CM

## 2023-04-26 DIAGNOSIS — Z11.4 SCREENING FOR HIV (HUMAN IMMUNODEFICIENCY VIRUS): ICD-10-CM

## 2023-04-26 DIAGNOSIS — R59.0 LYMPHADENOPATHY, MESENTERIC: ICD-10-CM

## 2023-04-26 DIAGNOSIS — J45.990 EXERCISE-INDUCED ASTHMA: ICD-10-CM

## 2023-04-26 DIAGNOSIS — Z00.00 ANNUAL PHYSICAL EXAM: Primary | ICD-10-CM

## 2023-04-26 DIAGNOSIS — S39.92XA TRAUMATIC INJURY OF BACK: ICD-10-CM

## 2023-04-26 DIAGNOSIS — M53.3 COCCYDYNIA: ICD-10-CM

## 2023-04-26 RX ORDER — CYCLOBENZAPRINE HCL 10 MG
10 TABLET ORAL 3 TIMES DAILY PRN
Qty: 30 TABLET | Refills: 0 | Status: SHIPPED | OUTPATIENT
Start: 2023-04-26

## 2023-04-26 RX ORDER — LEVALBUTEROL TARTRATE 45 UG/1
1-2 AEROSOL, METERED ORAL EVERY 4 HOURS PRN
Qty: 15 G | Refills: 1 | Status: SHIPPED | OUTPATIENT
Start: 2023-04-26 | End: 2023-05-26

## 2023-04-26 NOTE — PATIENT INSTRUCTIONS
Patient is here to establish care  He is been seen in the emergency department after sustaining a fall going down some attic steps  He injured his coccyx bone and continues to have coccydynia  He also is having some low back pain particularly on that left side where he previously had a work-related injury about 2 years ago  He is not having any red flag symptoms of severe problems or a condition called cauda equina syndrome  He does have a lot of muscle spasm especially on that left low back area and I am issuing him some Flexeril for short-term use in order to relieve the muscle spasm pain  He had only been taking Tylenol prior to that  Patient's blood pressure is excellent  His BMI is slightly elevated at 35 so if he could lose some weight that would help to make his body mass index within normal range  This can be done by portion sizes being lower watching the snacking and increasing activity  I will see him in 6 months  I have given him an inhaler for exercise-induced asthma in addition to the Flexeril  He has had labs in the past and his lipids were excellent  He is also had a colonoscopy at [facility] and we will try to get those results  Patient and see me at other times since he is now an established patient  I did do a physical exam on him today  I will be ordering a repeat CAT scan of his abdomen and pelvis with and without contrast in 6 months to see whether his lymphadenopathy has dissipated

## 2023-04-26 NOTE — LETTER
April 26, 2023     Patient: Alla Hooker  YOB: 1975  Date of Visit: 4/26/2023      To Whom it May Concern:    Hawk Obrien is under my professional care  Ileana Jesus was seen in my office on 4/26/2023  Ileana Lee may return to work on April 26th, 2023  Please excuse him for a one hour period on this date for his medical appointment       If you have any questions or concerns, please don't hesitate to call           Sincerely,          Lourena Favre, DMSc, PA-C          CC: No Recipients

## 2023-04-26 NOTE — PROGRESS NOTES
Assessment/Plan:       1  Annual physical exam    2  Lymphadenopathy, mesenteric  -     Ambulatory Referral to [facility]    3  Traumatic injury of back    4  Muscle spasm of back  -     cyclobenzaprine (FLEXERIL) 10 mg tablet; Take 1 tablet (10 mg total) by mouth 3 (three) times a day as needed for muscle spasms    5  Coccydynia    6  Exercise-induced asthma  -     levalbuterol (Xopenex HFA) 45 mcg/act inhaler; Inhale 1-2 puffs every 4 (four) hours as needed for wheezing    7  Need for hepatitis C screening test  -     Hepatitis C Antibody; Future    8  Screening for HIV (human immunodeficiency virus)  -     HIV 1/2 AG/AB w Reflex [facility] for 2 yr old and above; Future      This pleasant 48-year-old male is establishing care here [facility] care  On April 19, he was descending some attic steps and fell approximately 12 steps  He had immediate back tailbone and neck pain  No loss of consciousness  He was seen in the emergency department and assessed followed by treatment  He was asked to follow-up with primary care which is the reason for his visit today  Patient is having a lot of coccydynia  He is sitting on a donut at home but not able to sit on one at work  He has pain in his coccyx when ambulating  Patient had a work-related injury for [address] in 2021  He injured the left side of his back and had sciatica symptoms at that time  He currently is not having sciatic symptoms but he is having some spasm in his low back area  He has no urinary or fecal incontinence or changes  No headache  He is having some neck stiffness  He takes Tylenol for the pain  He has a history of migraine with aura that he takes Excedrin with Coke    He has seen neurology in the past   He most recently had a visit with neurology 3 months ago and he had a lipid profile done which was completely normal   He has not had any problems with hypertension or diabetes in the past     A CAT scan was performed of the lumbar spine during his ER visit a week ago  It was noted that he had some prominent left periaortic lymph nodes and subcentimeter lymph nodes in the retroperitoneal area  The recommendation was to repeat his CAT scan in a nonurgent manner with a CAT scan of the abdomen oral and IV contrast    Patient has a longstanding history of exercise-induced asthma  He was taking albuterol inhaler and asked for refill which I was happy to give him  He is also going to go on Flexeril for the spasm that he has in his back  A total of 50 minutes was spent rendering care for this patient  This time included review of the patient's electronic medical record, performing the history and physical, reviewing appropriate labs and/or images, developing a treatment and assessment plan, answering patient's questions and concerns, and documenting the patient visit  I will see him in follow-up in 6 months and will order his CAT scan of his abdomen and pelvis at that time  Subjective:      Patient ID: Makayla Rivera is a 49 y o  male  HPI: This pleasant 70-year-old male is seen as an ER follow-up and to establish care  I was able to perform a full physical on him today as part of today's visit  He is alert oriented and has a nontoxic appearance  He is cooperative with the exam     HEENT: Head is normocephalic  No tenderness on palpation around the head  His eyes are equal and respond to light  His TMs are clear  Carreon test does not lateralize  Karen test shows AC greater than BC bilaterally  He has negative ha sign  Throat is without hyperemia  Neck is supple without adenopathy  He had some tenseness of the parasternal muscles in the cervical area  His range of motion was slightly limited on forward flexion  Shoulder strength is normal     Back shows some increased spasm on the left lower lumbosacral spine  He did have some point tenderness on palpation of the coccyx externally      Patient's heart was "regular rate without murmur rub or gallops  Lungs are clear to auscultation  Abdomen is round and soft positive bowel sounds without masses tenderness or organomegaly  Extremities revealed adequate peripheral pulses no peripheral edema      The following portions of the patient's history were reviewed and updated as appropriate: allergies, current medications, past family history, past medical history, past social history, past surgical history, and problem list     Review of Systems      Objective:      /78 (BP Location: Right arm, Patient Position: Sitting)   Pulse 85   Temp (!) 97 1 °F (36 2 °C) (Tympanic)   Ht 5' 4\" (1 626 m)   Wt 92 8 kg (204 lb 9 4 oz)   SpO2 96%   BMI 35 12 kg/m²          Physical Exam    "

## 2023-05-10 ENCOUNTER — OFFICE VISIT (OUTPATIENT)
Dept: URGENT CARE | Facility: CLINIC | Age: 48
End: 2023-05-10

## 2023-05-10 VITALS
BODY MASS INDEX: 34.15 KG/M2 | TEMPERATURE: 99.9 F | WEIGHT: 200 LBS | OXYGEN SATURATION: 97 % | HEIGHT: 64 IN | DIASTOLIC BLOOD PRESSURE: 62 MMHG | RESPIRATION RATE: 16 BRPM | HEART RATE: 90 BPM | SYSTOLIC BLOOD PRESSURE: 106 MMHG

## 2023-05-10 DIAGNOSIS — J01.00 ACUTE NON-RECURRENT MAXILLARY SINUSITIS: Primary | ICD-10-CM

## 2023-05-10 RX ORDER — AMOXICILLIN 500 MG/1
500 TABLET, FILM COATED ORAL 2 TIMES DAILY
Qty: 14 TABLET | Refills: 0 | Status: SHIPPED | OUTPATIENT
Start: 2023-05-10 | End: 2023-05-17

## 2023-05-10 RX ORDER — BENZONATATE 200 MG/1
200 CAPSULE ORAL 3 TIMES DAILY PRN
Qty: 20 CAPSULE | Refills: 0 | Status: SHIPPED | OUTPATIENT
Start: 2023-05-10

## 2023-05-10 NOTE — PROGRESS NOTES
[facility]        NAME: Antelmo Rodriguez is a 49 y o  male  : 1975    MRN: [id number]  DATE: May 10, 2023  TIME: 9:17 AM    Assessment and Plan   Acute non-recurrent maxillary sinusitis [J01 00]  1  Acute non-recurrent maxillary sinusitis  amoxicillin (AMOXIL) 500 MG tablet    benzonatate (TESSALON) 200 MG capsule        Discussed problem with patient  Symptoms consistent with sinusitis as he is having sinus tenderness as well as 9 days worth of symptoms  Prescribing amoxicillin for coverage as well as Tessalon Perles for cough complaints  Recommended Mucinex and Sudafed for nasal congestion and should continue to push fluids  Follow-up with PCP if symptoms not improving report to the ER symptoms worsen  Patient Instructions       Follow up with PCP in 3-5 days  Proceed to  ER if symptoms worsen  Chief Complaint     Chief Complaint   Patient presents with   • Cold Like Symptoms     Cough, congestion, fever, post nasal drip and sore throat x 9 days         History of Present Illness       50-year-old male presents with 9 days of nasal congestion, cough, sore throat complaints  Other members of his household are sick with similar symptoms but has not been going on much longer  Denies any fevers or chills and has been taking Tylenol for symptoms  Appetite is decreased but is now pushing fluids stating he drinks 4 bottles of water a day  Reports headaches but states he has a history of migraines  Review of Systems   Review of Systems   Constitutional: Positive for appetite change (pushing fluids)  Negative for chills and fever  HENT: Positive for congestion, postnasal drip, rhinorrhea and sore throat  Negative for ear pain, sinus pressure and sinus pain  Respiratory: Positive for cough  Negative for shortness of breath, wheezing and stridor  Cardiovascular: Negative for chest pain and palpitations     Gastrointestinal: Negative for abdominal pain, diarrhea, nausea and "vomiting  Musculoskeletal: Negative for myalgias  Neurological: Positive for headaches  Negative for dizziness, syncope and light-headedness  Current Medications       Current Outpatient Medications:   •  amoxicillin (AMOXIL) 500 MG tablet, Take 1 tablet (500 mg total) by mouth 2 (two) times a day for 7 days, Disp: 14 tablet, Rfl: 0  •  benzonatate (TESSALON) 200 MG capsule, Take 1 capsule (200 mg total) by mouth 3 (three) times a day as needed for cough, Disp: 20 capsule, Rfl: 0  •  levalbuterol (Xopenex HFA) 45 mcg/act inhaler, Inhale 1-2 puffs every 4 (four) hours as needed for wheezing, Disp: 15 g, Rfl: 1  •  cyclobenzaprine (FLEXERIL) 10 mg tablet, Take 1 tablet (10 mg total) by mouth 3 (three) times a day as needed for muscle spasms, Disp: 30 tablet, Rfl: 0    Current Allergies     Allergies as of 05/10/2023 - Reviewed 05/10/2023   Allergen Reaction Noted   • Other Other (See Comments) 02/24/2017            The following portions of the patient's history were reviewed and updated as appropriate: allergies, current medications, past family history, past medical history, past social history, past surgical history and problem list      Past Medical History:   Diagnosis Date   • Asthma    • Kidney stones    • Migraines        Past Surgical History:   Procedure Laterality Date   • KIDNEY STONE SURGERY     • TOOTH EXTRACTION  12/26/2022       Family History   Problem Relation Age of Onset   • Lung cancer Mother    • Lung cancer Father          Medications have been verified  Objective   /62   Pulse 90   Temp 99 9 °F (37 7 °C)   Resp 16   Ht 5' 4\" (1 626 m)   Wt 90 7 kg (200 lb)   SpO2 97%   BMI 34 33 kg/m²        Physical Exam     Physical Exam  Vitals and nursing note reviewed  Constitutional:       General: He is not in acute distress  Appearance: Normal appearance  He is normal weight  He is not ill-appearing, toxic-appearing or diaphoretic  HENT:      Head: Normocephalic   " Right Ear: Tympanic membrane, ear canal and external ear normal  There is no impacted cerumen  Left Ear: Tympanic membrane, ear canal and external ear normal  There is no impacted cerumen  Nose: Congestion and rhinorrhea present  Mouth/Throat:      Mouth: Mucous membranes are moist       Pharynx: Oropharynx is clear  Posterior oropharyngeal erythema present  No oropharyngeal exudate  Eyes:      General:         Right eye: No discharge  Left eye: No discharge  Extraocular Movements: Extraocular movements intact  Conjunctiva/sclera: Conjunctivae normal       Pupils: Pupils are equal, round, and reactive to light  Neck:      Vascular: No carotid bruit  Cardiovascular:      Rate and Rhythm: Normal rate and regular rhythm  Pulses: Normal pulses  Heart sounds: Normal heart sounds  No murmur heard  No friction rub  No gallop  Pulmonary:      Effort: Pulmonary effort is normal  No respiratory distress  Breath sounds: Normal breath sounds  No stridor  No wheezing, rhonchi or rales  Chest:      Chest wall: No tenderness  Musculoskeletal:         General: No swelling, tenderness or signs of injury  Normal range of motion  Cervical back: Normal range of motion and neck supple  No rigidity or tenderness  Lymphadenopathy:      Cervical: No cervical adenopathy  Skin:     General: Skin is warm and dry  Capillary Refill: Capillary refill takes less than 2 seconds  Coloration: Skin is not jaundiced or pale  Findings: No erythema  Neurological:      General: No focal deficit present  Mental Status: He is alert and oriented to person, place, and time     Psychiatric:         Mood and Affect: Mood normal          Behavior: Behavior normal

## 2023-05-10 NOTE — LETTER
May 10, 2023     Patient: Antelmo Rodriguez   YOB: 1975   Date of Visit: 5/10/2023       To Whom it May Concern:    Kika Cason was seen in my clinic on 5/10/2023  He may return to work on 05/11  If you have any questions or concerns, please don't hesitate to call           Sincerely,          Denver Leigh, PA-C        CC: No Recipients

## 2023-09-07 ENCOUNTER — OFFICE VISIT (OUTPATIENT)
Dept: FAMILY MEDICINE CLINIC | Facility: CLINIC | Age: 48
End: 2023-09-07
Payer: COMMERCIAL

## 2023-09-07 VITALS
HEART RATE: 78 BPM | OXYGEN SATURATION: 96 % | HEIGHT: 64 IN | BODY MASS INDEX: 35 KG/M2 | WEIGHT: 205.03 LBS | DIASTOLIC BLOOD PRESSURE: 67 MMHG | TEMPERATURE: 97.8 F | SYSTOLIC BLOOD PRESSURE: 115 MMHG

## 2023-09-07 DIAGNOSIS — M79.18 MYOFASCIAL PAIN SYNDROME, CERVICAL: ICD-10-CM

## 2023-09-07 DIAGNOSIS — Z59.9 FINANCIAL DIFFICULTIES: ICD-10-CM

## 2023-09-07 DIAGNOSIS — G43.109 MIGRAINE WITH AURA AND WITHOUT STATUS MIGRAINOSUS, NOT INTRACTABLE: ICD-10-CM
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
## 2023-09-07 DIAGNOSIS — Z01.818 PREOPERATIVE EVALUATION TO RULE OUT SURGICAL CONTRAINDICATION: Primary | ICD-10-CM

## 2023-09-07 PROCEDURE — 99214 OFFICE O/P EST MOD 30 MIN: CPT | Performed by: PHYSICIAN ASSISTANT

## 2023-09-07 SDOH — ECONOMIC STABILITY - INCOME SECURITY: PROBLEM RELATED TO HOUSING AND ECONOMIC CIRCUMSTANCES, UNSPECIFIED: Z59.9

## 2023-09-07 NOTE — PROGRESS NOTES
Assessment/Plan:       1. Preoperative evaluation to rule out surgical contraindication  -     CBC and Platelet; Future  -     Basic metabolic panel; Future    2. Financial difficulties  -     Ambulatory Referral to Social Work Care Management Program; Future    3. Work related injury    4. Myofascial pain syndrome, cervical    5. Migraine with aura and without status migrainosus, not intractable      This 50-year-old male sees me for his primary care services. Patient is here specifically for a preoperative evaluation prior to having a proposed anterior cervical fusion C5-C6 to be done by Dr. Bobbi Mancilla on September 27, 2023. Patient had a work-related injury to his back and neck area many years ago. He initially pursued [employer]. but ultimately did not receive this and he was told that he was now making too much money in order to get any cash pay out. 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 His pain has increased to the point where he cannot tolerate the paresthesias in both upper extremities. He states that he does not feel sensations strongly on the left upper extremity than in the right upper extremity. He has tried and failed physical therapy. He has had anesthesia in the past for colonoscopy in 2012  as well with kidney stone removal and stent placement. He has tolerated anesthesia without difficulty. No family history of adverse reactions to anesthesia. Patient's complaints are limited to the musculoskeletal system. He is able to walk 2 flights of stairs without stopping and without symptoms. He denies pain in his chest heart palpitations dizziness lightheadedness syncope or presyncope. He is a lifetime non-smoker. He takes no medications on a regular basis. He denies easy bruising or bleeding.   He denies difficulty moving his bowels or passing his water.    He does have a history of migraines that he gets approximately once every 2 weeks. This responds to over-the-counter Excedrin with a half a glass of Coca-Cola. He does usually get a warning before his migraines and he tries to take the medication as soon as possible. Patient does not have a lot of money saved up and he is the sole wage earner in his family. He has 2 young children and a stay-at-home wife. I am consulting  to see if there is any financial assistance programs that could be offered to this young family. Patient states he does not have many days saved up for PTO and this is a concern for him. There are no contraindications for the proposed surgery. This is pending normal findings on his complete blood count and his basic metabolic profile. A total of 35 minutes was spent rendering care for this patient. This time included review of the patient's electronic medical record, performing the history and physical, reviewing appropriate labs and/or images, developing a treatment and assessment plan, answering patient's questions and concerns, and documenting the patient visit. He has an appointment to see me on the 30th. Subjective:      Patient ID: Trena Babinski is a 51 y.o. male. HPI: Pleasant 39-year-old male who is alert oriented and capably answering questions. He has a nontoxic appearance. He is able to speak in full sentences. He denies pain in his chest heart palpitations dizziness lightheadedness syncope or presyncope. No changes in his bowel or bladder habits. He does have neck pain which radiates down both upper extremities. He denies any paralysis. He states he does not feel the same sensation on the left upper extremity compared to the right and that the sensation is duller. He denies any peripheral edema. He denies easy bruising or bleeding.     The following portions of the patient's history were reviewed and updated as appropriate: allergies, current medications, past family history, past medical history, past social history, past surgical history, and problem list.    Review of Systems  No recent URI or viral syndrome. Not a lot of muscle spasm at this time. No longer needing Flexeril. Objective:      /67 (BP Location: Right arm, Patient Position: Sitting, Cuff Size: Standard)   Pulse 78   Temp 97.8 °F (36.6 °C) (Tympanic)   Ht 5' 4" (1.626 m)   Wt 93 kg (205 lb 0.4 oz)   SpO2 96%   BMI 35.19 kg/m²          Physical Exam  Well-developed well-nourished 51y.o. year old male who is cooperative with the exam.  Patient is alert and oriented x3. Patient is appropriate in answering all questions. HEENT:  Normocephalic. PERRLA. EOMs intact. TMs are clear with identification of bony landmarks. No tragus or pinnae tenderness. No pre or posterior auricular adenopathy. Sinuses without tenderness. Throat without hyperemia. Neck:  Supple without adenopathy. Thyroid midline without thyromegaly or bruits. No carotid bruits. Chest symmetric and nontender. Heart regular rate and rhythm. No murmur rubs or gallops. Point of maximum impulse not displaced. Lungs are clear to auscultation. Breathing is nonlabored. Aerating bases well. Abdomen round and soft positive bowel sounds without masses tenderness or organomegaly. Extremities reveal adequate peripheral pulses without peripheral edema. MSK: Patient has slight decreased  strength on the left hand compared to the right hand. Slight decreased wrist extension and flexion. Slight decreased tricep and biceps on the left compared to the right. For sensation, patient was able to recognize light touch and monofilament strength testing that he states is sensation in the left upper extremity was lighter in the lower than the right upper extremity. Reflexes are +2 bilaterally without any hyperreflexia.   He has adequate radial pulses and good capillary refill.

## 2023-09-11 ENCOUNTER — PATIENT OUTREACH (OUTPATIENT)
Dept: FAMILY MEDICINE CLINIC | Facility: CLINIC | Age: 48
End: 2023-09-11

## 2023-09-11 NOTE — PROGRESS NOTES
OP CM called to pt and LM in regards to needing surgery and having limited income and PTO and having to provide for his family. Chart reviewed. Unable to leave message as pts mailbox is full at this time. Email sent to pt as well:    Hello,     I am the  that covers [employer]. Malik Poole consulted me in regards to your upcoming surgery and your concerns. As far as having limited PTO you may be able to ask if your employer allows co workers to donate their time to you and also see if you get short term disability which would not be full pay but at least some. If you are the only one working in the home, do you know if you are eligible for food stamps or have you been told your income to high? If you are renting you can contact [address] [place] [address] to see if rent assistance is available for you.    [employer]  Cullen Wilde, [address] [place] [address] Rd  [phone]    If you own your home you can contact your [employer] company directly and see if they can assist you with additional time or forgiveness since you will be out of work. I hope your surgery goes well! Feel free to reach out to me with any further concerns.       Take Care,     Sarah Beth Saenz

## 2023-09-12 ENCOUNTER — PATIENT OUTREACH (OUTPATIENT)
Dept: FAMILY MEDICINE CLINIC | Facility: CLINIC | Age: 48
End: 2023-09-12

## 2023-09-12 NOTE — PROGRESS NOTES
OP CM rcvd VM from pt. Called back to pt but VM is full and unable to leave message. Email also sent to pt with resources.

## 2023-10-30 ENCOUNTER — APPOINTMENT (OUTPATIENT)
Dept: LAB | Facility: CLINIC | Age: 48
End: 2023-10-30
Payer: COMMERCIAL

## 2023-10-30 ENCOUNTER — OFFICE VISIT (OUTPATIENT)
Dept: FAMILY MEDICINE CLINIC | Facility: CLINIC | Age: 48
End: 2023-10-30
Payer: COMMERCIAL

## 2023-10-30 VITALS
HEART RATE: 86 BPM | SYSTOLIC BLOOD PRESSURE: 117 MMHG | DIASTOLIC BLOOD PRESSURE: 65 MMHG | HEIGHT: 64 IN | WEIGHT: 203.48 LBS | OXYGEN SATURATION: 97 % | TEMPERATURE: 96.5 F | BODY MASS INDEX: 34.74 KG/M2

## 2023-10-30 DIAGNOSIS — M48.02 CERVICAL SPINAL STENOSIS: ICD-10-CM

## 2023-10-30 DIAGNOSIS — M54.42 LEFT-SIDED LOW BACK PAIN WITH LEFT-SIDED SCIATICA, UNSPECIFIED CHRONICITY: ICD-10-CM

## 2023-10-30 DIAGNOSIS — G43.109 MIGRAINE WITH AURA AND WITHOUT STATUS MIGRAINOSUS, NOT INTRACTABLE: ICD-10-CM

## 2023-10-30 DIAGNOSIS — Z11.4 SCREENING FOR HIV (HUMAN IMMUNODEFICIENCY VIRUS): ICD-10-CM

## 2023-10-30 DIAGNOSIS — M79.18 MYOFASCIAL PAIN SYNDROME, CERVICAL: Primary | ICD-10-CM

## 2023-10-30 DIAGNOSIS — Z01.818 PREOPERATIVE EVALUATION TO RULE OUT SURGICAL CONTRAINDICATION: ICD-10-CM

## 2023-10-30 DIAGNOSIS — J45.990 EXERCISE-INDUCED ASTHMA: ICD-10-CM

## 2023-10-30 DIAGNOSIS — Z11.59 NEED FOR HEPATITIS C SCREENING TEST: ICD-10-CM

## 2023-10-30 LAB
ANION GAP SERPL CALCULATED.3IONS-SCNC: 7 MMOL/L
BUN SERPL-MCNC: 12 MG/DL (ref 5–25)
CALCIUM SERPL-MCNC: 9.2 MG/DL (ref 8.4–10.2)
CHLORIDE SERPL-SCNC: 104 MMOL/L (ref 96–108)
CO2 SERPL-SCNC: 28 MMOL/L (ref 21–32)
CREAT SERPL-MCNC: 0.85 MG/DL (ref 0.6–1.3)
ERYTHROCYTE [DISTWIDTH] IN BLOOD BY AUTOMATED COUNT: 13.1 % (ref 11.6–15.1)
GFR SERPL CREATININE-BSD FRML MDRD: 102 ML/MIN/1.73SQ M
GLUCOSE P FAST SERPL-MCNC: 105 MG/DL (ref 65–99)
HCT VFR BLD AUTO: 42.2 % (ref 36.5–49.3)
HCV AB SER QL: NORMAL
HGB BLD-MCNC: 14.5 G/DL (ref 12–17)
HIV 1+2 AB+HIV1 P24 AG SERPL QL IA: NORMAL
HIV 2 AB SERPL QL IA: NORMAL
HIV1 AB SERPL QL IA: NORMAL
HIV1 P24 AG SERPL QL IA: NORMAL
MCH RBC QN AUTO: 29.5 PG (ref 26.8–34.3)
MCHC RBC AUTO-ENTMCNC: 34.4 G/DL (ref 31.4–37.4)
MCV RBC AUTO: 86 FL (ref 82–98)
PLATELET # BLD AUTO: 204 THOUSANDS/UL (ref 149–390)
PMV BLD AUTO: 11.6 FL (ref 8.9–12.7)
POTASSIUM SERPL-SCNC: 3.9 MMOL/L (ref 3.5–5.3)
RBC # BLD AUTO: 4.92 MILLION/UL (ref 3.88–5.62)
SODIUM SERPL-SCNC: 139 MMOL/L (ref 135–147)
WBC # BLD AUTO: 5.97 THOUSAND/UL (ref 4.31–10.16)

## 2023-10-30 PROCEDURE — 86803 HEPATITIS C AB TEST: CPT

## 2023-10-30 PROCEDURE — 87389 HIV-1 AG W/HIV-1&-2 AB AG IA: CPT

## 2023-10-30 PROCEDURE — 85027 COMPLETE CBC AUTOMATED: CPT

## 2023-10-30 PROCEDURE — 3725F SCREEN DEPRESSION PERFORMED: CPT | Performed by: PHYSICIAN ASSISTANT

## 2023-10-30 PROCEDURE — 36415 COLL VENOUS BLD VENIPUNCTURE: CPT

## 2023-10-30 PROCEDURE — 80048 BASIC METABOLIC PNL TOTAL CA: CPT

## 2023-10-30 PROCEDURE — 99214 OFFICE O/P EST MOD 30 MIN: CPT | Performed by: PHYSICIAN ASSISTANT

## 2023-10-30 RX ORDER — LEVALBUTEROL TARTRATE 45 UG/1
1-2 AEROSOL, METERED ORAL EVERY 4 HOURS PRN
COMMUNITY

## 2023-10-30 NOTE — PROGRESS NOTES
Assessment/Plan:       1. Myofascial pain syndrome, cervical    2. Cervical spinal stenosis  -     Ambulatory Referral to Physical Therapy; Future    3. Migraine with aura and without status migrainosus, not intractable  -     Ambulatory Referral to Physical Therapy; Future    4. BMI 34.0-34.9,adult    5. Left-sided low back pain with left-sided sciatica, unspecified chronicity  -     Ambulatory Referral to Physical Therapy; Future    6. Exercise-induced asthma        This 48-year-old sees me for primary care services. Patient's last visit was in September for preoperative evaluation prior to his planned anterior cervical fusion C5-C6 to be done at [facility] on October 7. 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 Patient went through preoperative testing and ultimately decided to cancel the surgery because he cannot afford to be off work for that long period of time. We had a long discussion with regard to his symptoms and the need for surgical remedy. 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 He is going to meet with HR to see if any additional time could be donated for him so that he get back on the surgical schedule. Since I saw him in September, if he is placed back on the surgical schedule, he will need to have another medical optimization before the surgery. He continues to have pain primarily in the right upper extremity with radicular pain going into both hands. He is ambidextrous. The patient states that he is also having left-sided back pain with left-sided sciatica. We talked about the potential for physical therapy getting some relief until he is able to schedule the surgery and he is amenable to this.     He was offered an influenza vaccine in said that he gets sick with every flu vaccine so he declined. Patient's BMI is 34.93. He states he would like to lose weight. BMI is above normal. Nutrition recommendations include reducing portion sizes, decreasing overall calorie intake, and 3-5 servings of fruits/vegetables daily. He is not able to get exercise other than walking because of his neck and back pain. Since he works next to a walking trail and he gets an hour for lunch, I suggested some early mobilization and walking to tolerance with building extra endurance as tolerated. Patient continues to have weekly migraines. Caffeine seems to take the migraines away. He has mild exercise-induced asthma treated with Xopenex inhaler as needed. No need for rescue inhaler other than this and no need for long-term corticosteroids. A total of 34 minutes was spent rendering care for this patient. This time included review of the patient's electronic medical record, performing the history and physical, reviewing appropriate labs and/or images, developing a treatment and assessment plan, answering patient's questions and concerns, and documenting the patient visit. He is going to attempt weight loss on his own for the next 3 months and will see me in 3 months if he is not able to lose weight through diet and exercise. He will need his next annual exam in April 2024. Subjective:      Patient ID: Alida Spann is a 51 y.o. male. HPI: 13-year-old male who is complaining of neck and back pain. These are ongoing. He states that he did have a work-related injury to apply for [employer] Comp. but had been denied in the past.  He was scheduled for anterior cervical fusion surgery on October 7 but this was canceled due to his inability to miss at least 8 weeks of work after the surgery. It was planned that he would have his neck surgery and after full recovery would have back surgery.   The surgeries are currently on hold.    Patient denies pain in his chest heart palpitations dizziness or lightheadedness. He does have borborygmi but no associated pain with this. He continues to have radicular pain going down both upper extremities worse on the right than the left. He has left-sided sciatica with pain in the low back area going distally into the left lower extremity. No red flags associated with this pain. The following portions of the patient's history were reviewed and updated as appropriate: allergies, current medications, past family history, past medical history, past social history, past surgical history, and problem list.    Review of Systems no recent URI or viral syndrome. Objective:      /65 (BP Location: Right arm, Patient Position: Sitting, Cuff Size: Standard)   Pulse 86   Temp (!) 96.5 °F (35.8 °C) (Tympanic)   Ht 5' 4" (1.626 m)   Wt 92.3 kg (203 lb 7.8 oz)   SpO2 97%   BMI 34.93 kg/m²          Physical Exam well-developed well-nourished 16-year-old pleasant male who is alert and oriented and cooperative with the exam.  Vital signs reviewed. He is afebrile and normotensive. Patient's heart is regular rate without murmur rub or gallops. Lungs are clear to auscultation.

## 2023-12-09 ENCOUNTER — HOSPITAL ENCOUNTER (EMERGENCY)
Facility: HOSPITAL | Age: 48
Discharge: HOME | End: 2023-12-10
Attending: EMERGENCY MEDICINE
Payer: COMMERCIAL

## 2023-12-09 ENCOUNTER — APPOINTMENT (EMERGENCY)
Dept: RADIOLOGY | Facility: HOSPITAL | Age: 48
End: 2023-12-09
Payer: COMMERCIAL

## 2023-12-09 VITALS
WEIGHT: 203 LBS | BODY MASS INDEX: 34.66 KG/M2 | HEART RATE: 80 BPM | TEMPERATURE: 97.6 F | RESPIRATION RATE: 16 BRPM | DIASTOLIC BLOOD PRESSURE: 67 MMHG | SYSTOLIC BLOOD PRESSURE: 110 MMHG | OXYGEN SATURATION: 96 % | HEIGHT: 64 IN

## 2023-12-09 DIAGNOSIS — J06.9 VIRAL URI WITH COUGH: Primary | ICD-10-CM

## 2023-12-09 PROCEDURE — 71045 X-RAY EXAM CHEST 1 VIEW: CPT

## 2023-12-09 PROCEDURE — 99284 EMERGENCY DEPT VISIT MOD MDM: CPT | Performed by: EMERGENCY MEDICINE

## 2023-12-09 PROCEDURE — 99283 EMERGENCY DEPT VISIT LOW MDM: CPT

## 2023-12-09 PROCEDURE — 0241U HB NFCT DS VIR RESP RNA 4 TRGT: CPT | Performed by: EMERGENCY MEDICINE

## 2023-12-09 NOTE — Clinical Note
Danise Dakins was seen and treated in our emergency department on 12/9/2023. Diagnosis:     Ana Maria Hill  may return to work on return date. He may return on this date: 12/11/2023         If you have any questions or concerns, please don't hesitate to call.       Becky Pizarro MD    ______________________________           _______________          _______________  Hospital Representative                              Date                                Time

## 2023-12-10 LAB
FLUAV RNA RESP QL NAA+PROBE: NEGATIVE
FLUBV RNA RESP QL NAA+PROBE: NEGATIVE
RSV RNA RESP QL NAA+PROBE: NEGATIVE
SARS-COV-2 RNA RESP QL NAA+PROBE: NEGATIVE

## 2023-12-10 NOTE — ED PROVIDER NOTES
History  Chief Complaint   Patient presents with    Cough     Cough for " a few days". History provided by:  Medical records and patient  URI  Presenting symptoms: cough, rhinorrhea and sore throat    Presenting symptoms: no ear pain, no fatigue and no fever    Severity:  Mild  Onset quality:  Gradual  Duration:  1 week  Timing:  Constant  Progression:  Unchanged  Chronicity:  New  Relieved by:  Nothing  Worsened by:  Nothing  Ineffective treatments:  None tried  Associated symptoms: no arthralgias and no headaches    Risk factors: sick contacts        Prior to Admission Medications   Prescriptions Last Dose Informant Patient Reported? Taking?   levalbuterol (XOPENEX HFA) 45 mcg/act inhaler   Yes No   Sig: Inhale 1-2 puffs every 4 (four) hours as needed for wheezing      Facility-Administered Medications: None       Past Medical History:   Diagnosis Date    Asthma     Kidney stones     Migraines        Past Surgical History:   Procedure Laterality Date    KIDNEY STONE SURGERY      TOOTH EXTRACTION  12/26/2022       Family History   Problem Relation Age of Onset    Lung cancer Mother     Lung cancer Father      I have reviewed and agree with the history as documented. E-Cigarette/Vaping    E-Cigarette Use Never User      E-Cigarette/Vaping Substances     Social History     Tobacco Use    Smoking status: Never    Smokeless tobacco: Never   Vaping Use    Vaping Use: Never used   Substance Use Topics    Alcohol use: Never    Drug use: Never       Review of Systems   Constitutional:  Negative for appetite change, chills, fatigue and fever. HENT:  Positive for rhinorrhea and sore throat. Negative for ear pain and trouble swallowing. Eyes:  Negative for pain, discharge and visual disturbance. Respiratory:  Positive for cough. Negative for chest tightness and shortness of breath. Cardiovascular:  Negative for chest pain and palpitations.    Gastrointestinal:  Negative for abdominal pain, nausea and vomiting. Endocrine: Negative for polydipsia, polyphagia and polyuria. Genitourinary:  Negative for difficulty urinating, dysuria, hematuria and testicular pain. Musculoskeletal:  Negative for arthralgias and back pain. Skin:  Negative for color change and rash. Allergic/Immunologic: Negative for immunocompromised state. Neurological:  Negative for dizziness, seizures, syncope, weakness and headaches. Hematological:  Negative for adenopathy. Psychiatric/Behavioral:  Negative for confusion and dysphoric mood. All other systems reviewed and are negative. Physical Exam  Physical Exam  Vitals and nursing note reviewed. Constitutional:       General: He is not in acute distress. Appearance: Normal appearance. He is not ill-appearing, toxic-appearing or diaphoretic. HENT:      Head: Normocephalic and atraumatic. Nose: Congestion and rhinorrhea present. Mouth/Throat:      Mouth: Mucous membranes are moist.      Pharynx: Oropharynx is clear. Posterior oropharyngeal erythema present. No oropharyngeal exudate. Eyes:      General:         Right eye: No discharge. Left eye: No discharge. Cardiovascular:      Rate and Rhythm: Normal rate and regular rhythm. Pulses: Normal pulses. Heart sounds: Normal heart sounds. No murmur heard. No gallop. Pulmonary:      Effort: Pulmonary effort is normal. No respiratory distress. Breath sounds: Normal breath sounds. No stridor. No wheezing, rhonchi or rales. Chest:      Chest wall: No tenderness. Abdominal:      General: Bowel sounds are normal. There is no distension. Palpations: Abdomen is soft. There is no mass. Tenderness: There is no abdominal tenderness. There is no right CVA tenderness, left CVA tenderness, guarding or rebound. Hernia: No hernia is present. Musculoskeletal:         General: Normal range of motion. Cervical back: Normal range of motion and neck supple.    Skin: General: Skin is warm and dry. Capillary Refill: Capillary refill takes less than 2 seconds. Neurological:      General: No focal deficit present. Mental Status: He is alert and oriented to person, place, and time. Cranial Nerves: No cranial nerve deficit. Sensory: No sensory deficit. Motor: No weakness. Coordination: Coordination normal.      Gait: Gait normal.      Deep Tendon Reflexes: Reflexes normal.   Psychiatric:         Mood and Affect: Mood normal.         Behavior: Behavior normal.         Thought Content: Thought content normal.         Judgment: Judgment normal.         Vital Signs  ED Triage Vitals [12/09/23 2321]   Temperature Pulse Respirations Blood Pressure SpO2   97.6 °F (36.4 °C) 80 16 110/67 96 %      Temp Source Heart Rate Source Patient Position - Orthostatic VS BP Location FiO2 (%)   Temporal Monitor Sitting Left arm --      Pain Score       7           Vitals:    12/09/23 2321   BP: 110/67   Pulse: 80   Patient Position - Orthostatic VS: Sitting         Visual Acuity      ED Medications  Medications - No data to display    Diagnostic Studies  Results Reviewed       Procedure Component Value Units Date/Time    FLU/RSV/COVID - if FLU/RSV clinically relevant [[id number]]  (Normal) Collected: 12/09/23 2340    Lab Status: Final result Specimen: Nares from Nose Updated: 12/10/23 0045     SARS-CoV-2 Negative     INFLUENZA A PCR Negative     INFLUENZA B PCR Negative     RSV PCR Negative    Narrative:      FOR PEDIATRIC PATIENTS - copy/paste COVID Guidelines URL to browser: [email or web address]    SARS-CoV-2 assay is a Nucleic Acid Amplification assay intended for the  qualitative detection of nucleic acid from SARS-CoV-2 in nasopharyngeal  swabs. Results are for the presumptive identification of SARS-CoV-2 RNA.     Positive results are indicative of infection with SARS-CoV-2, the virus  causing COVID-19, but do not rule out bacterial infection or co-infection  with other viruses. Laboratories within the [facility] and its  territories are required to report all positive results to the appropriate  public health authorities. Negative results do not preclude SARS-CoV-2  infection and should not be used as the sole basis for treatment or other  patient management decisions. Negative results must be combined with  clinical observations, patient history, and epidemiological information. This test has not been FDA cleared or approved. This test has been authorized by FDA under an Emergency Use Authorization  (EUA). This test is only authorized for the duration of time the  declaration that circumstances exist justifying the authorization of the  emergency use of an in vitro diagnostic tests for detection of SARS-CoV-2  virus and/or diagnosis of COVID-19 infection under section 564(b)(1) of  the Act, 21 U. S.C. 013ZQQ-9(J)(5), unless the authorization is terminated  or revoked sooner. The test has been validated but independent review by FDA  and CLIA is pending. Test performed using [employer] GeneXpert: This RT-PCR assay targets N2,  a region unique to SARS-CoV-2. A conserved region in the E-gene was chosen  for pan-Sarbecovirus detection which includes SARS-CoV-2. According to CMS-2020-01-R, this platform meets the definition of high-throughput technology. XR chest 1 view portable    (Results Pending)              Procedures  Procedures         ED Course                               SBIRT 22yo+      Flowsheet Row Most Recent Value   Initial Alcohol Screen: US AUDIT-C     1. How often do you have a drink containing alcohol? 0 Filed at: 12/09/2023 2328   2. How many drinks containing alcohol do you have on a typical day you are drinking? 0 Filed at: 12/09/2023 2328   3a. Male UNDER 65: How often do you have five or more drinks on one occasion? 0 Filed at: 12/09/2023 2328   3b.  FEMALE Any Age, or MALE 65+: How often do you have 4 or more drinks on one occassion? 0 Filed at: 12/09/2023 2328   Audit-C Score 0 Filed at: 12/09/2023 2328   IRVIN: How many times in the past year have you. .. Used an illegal drug or used a prescription medication for non-medical reasons? Never Filed at: 12/09/2023 2328                      Medical Decision Making  2302: Patient appears well, vital signs reviewed. Patient appears to be self from viral URI. Plan to complete chest x-ray for reassurance. Send COVID/flu/RSV PCR. No respiratory distress. Symptomatic care instructed. Amount and/or Complexity of Data Reviewed  Radiology: ordered and independent interpretation performed. Details: Chest x-ray no acute pathology             Disposition  Final diagnoses:   Viral URI with cough     Time reflects when diagnosis was documented in both MDM as applicable and the Disposition within this note       Time User Action Codes Description Comment    12/10/2023 12:23 AM Robert Mcdowell Add [J06.9] Viral URI with cough           ED Disposition       ED Disposition   Discharge    Condition   Stable    Date/Time   Sun Dec 10, 2023 [address] discharge to home/self care. Follow-up Information       Follow up With Specialties Details Why Contact Info    Delisa Hoffmann PA-C Physician Assistant, Family Medicine Schedule an appointment as soon as possible for a visit   [facility]   [address] [place] [address]  [phone]              Discharge Medication List as of 12/10/2023 12:23 AM        CONTINUE these medications which have NOT CHANGED    Details   levalbuterol (XOPENEX HFA) 45 mcg/act inhaler Inhale 1-2 puffs every 4 (four) hours as needed for wheezing, Historical Med             No discharge procedures on file.     PDMP Review       None            ED Provider  Electronically Signed by             Mortimer Barter, MD  12/10/23 8651

## 2023-12-13 ENCOUNTER — TELEPHONE (OUTPATIENT)
Dept: FAMILY MEDICINE CLINIC | Facility: CLINIC | Age: 48
End: 2023-12-13

## 2023-12-13 NOTE — TELEPHONE ENCOUNTER
Called the patient to see if he would like to schedule a follow up from his ER visit. His VM was full so I was unable to leave a message.

## 2023-12-14 ENCOUNTER — OFFICE VISIT (OUTPATIENT)
Dept: URGENT CARE | Facility: CLINIC | Age: 48
End: 2023-12-14
Payer: COMMERCIAL

## 2023-12-14 VITALS
TEMPERATURE: 98.2 F | BODY MASS INDEX: 33.29 KG/M2 | HEIGHT: 64 IN | RESPIRATION RATE: 18 BRPM | WEIGHT: 195 LBS | DIASTOLIC BLOOD PRESSURE: 66 MMHG | SYSTOLIC BLOOD PRESSURE: 128 MMHG | HEART RATE: 80 BPM | OXYGEN SATURATION: 97 %

## 2023-12-14 DIAGNOSIS — J40 BRONCHITIS: Primary | ICD-10-CM

## 2023-12-14 PROCEDURE — 99213 OFFICE O/P EST LOW 20 MIN: CPT

## 2023-12-14 RX ORDER — PREDNISONE 20 MG/1
40 TABLET ORAL DAILY
Qty: 10 TABLET | Refills: 0 | Status: SHIPPED | OUTPATIENT
Start: 2023-12-14 | End: 2023-12-19

## 2023-12-14 RX ORDER — AZITHROMYCIN 250 MG/1
TABLET, FILM COATED ORAL
Qty: 6 TABLET | Refills: 0 | Status: SHIPPED | OUTPATIENT
Start: 2023-12-14 | End: 2023-12-18

## 2023-12-14 RX ORDER — BENZONATATE 100 MG/1
100 CAPSULE ORAL 3 TIMES DAILY PRN
Qty: 20 CAPSULE | Refills: 0 | Status: SHIPPED | OUTPATIENT
Start: 2023-12-14 | End: 2023-12-21

## 2023-12-14 NOTE — PATIENT INSTRUCTIONS
Take antibiotic as prescribed  Take oral steroids as prescribed  Continue with supportive measures, OTC Tylenol/Ibuprofen, nasal decongestants, and cough suppressants (Tessalon Perles)   Cool mist humidifiers, throat lozenges, increased fluid intake and rest   Follow up with PCP in 3-5 days  Present to ER if symptoms worsen     Acute Bronchitis   AMBULATORY CARE:   Acute bronchitis  is swelling and irritation in your lungs. It is usually caused by a virus and most often happens in the winter. Bronchitis may also be caused by bacteria or by a chemical irritant, such as smoke. Common symptoms:   Cough that lasts up to 3 weeks    Runny or stuffy nose    Hoarseness, sore throat    Fever    Feeling more tired than usual, and body aches    Wheezing or pain when you breathe or cough    Seek care immediately if:   You cough up blood. Your lips or fingernails turn blue. You feel like you are not getting enough air when you breathe. Call your doctor if:   Your symptoms do not go away or get worse, even after treatment. Your cough does not get better within 4 weeks. You have questions or concerns about your condition or care. Medicines: You may need any of the following:  Cough suppressants  decrease your urge to cough. Decongestants  help loosen mucus in your lungs and make it easier to cough up. This can help you breathe easier. Inhalers  may be given. Your healthcare provider may give you one or more inhalers to help you breathe easier and cough less. An inhaler gives you medicine to open your airways. Ask your healthcare provider to show you how to use your inhaler correctly. Antiviral medicine  treats infections caused by a virus. Antibiotics  may be given if your bronchitis is caused by bacteria or if you have lung condition. Acetaminophen  decreases pain and fever. It is available without a doctor's order. Ask how much to take and how often to take it. Follow directions.  Read the labels of all other medicines you are using to see if they also contain acetaminophen, or ask your doctor or pharmacist. Acetaminophen can cause liver damage if not taken correctly. NSAIDs  help decrease swelling and pain or fever. This medicine is available with or without a doctor's order. NSAIDs can cause stomach bleeding or kidney problems in certain people. If you take blood thinner medicine, always ask your healthcare provider if NSAIDs are safe for you. Always read the medicine label and follow directions. Self-care:   Drink liquids as directed. You may need to drink more liquids than usual to stay hydrated. Ask how much liquid to drink each day and which liquids are best for you. Use a cool mist humidifier. This increases air moisture in your home. This may make it easier for you to breathe and help decrease your cough. Get more rest.  Rest helps your body to heal. Slowly start to do more each day. Rest when you feel it is needed. Prevent acute bronchitis:       Ask about vaccines you may need. Get a flu vaccine each year as soon as recommended, usually in September or October. Ask your healthcare provider if you should also get a pneumonia or COVID-19 vaccine. Your healthcare provider can tell you if you should also get other vaccines, and when to get them. Prevent the spread of germs. You can decrease your risk for acute bronchitis and other illnesses by doing the following:    Wash your hands often with soap and water. Carry germ-killing hand lotion or gel with you. You can use the lotion or gel to clean your hands when soap and water are not available. Do not touch your eyes, nose, or mouth unless you have washed your hands first.    Always cover your mouth when you cough to prevent the spread of germs. It is best to cough into a tissue or your shirt sleeve instead of into your hand. Ask those around you to cover their mouths when they cough.     Try to avoid people who have a cold or the flu. If you are sick, stay away from others as much as possible. Avoid irritants in the air. Avoid chemicals, fumes, and dust. Wear a face mask if you must work around dust or fumes. Stay inside on days when air pollution levels are high. If you have allergies, stay inside when pollen counts are high. Do not use aerosol products, such as spray-on deodorant, bug spray, and hair spray. Do not smoke or be around others who are smoking. Nicotine and other chemicals in cigarettes and cigars can cause lung damage. Ask your healthcare provider for information if you currently smoke and need help to quit. E-cigarettes or smokeless tobacco still contain nicotine. Talk to your healthcare provider before you use these products. Follow up with your doctor as directed:  Write down questions you have so you will remember to ask them during your follow-up visits. © Copyright Mi[employer]lavaam[employer] Castelan 2023 Information is for End User's use only and may not be sold, redistributed or otherwise used for commercial purposes. The above information is an  only. It is not intended as medical advice for individual conditions or treatments. Talk to your doctor, nurse or pharmacist before following any medical regimen to see if it is safe and effective for you.

## 2023-12-14 NOTE — PROGRESS NOTES
[facility]        NAME: Lavinia Jimenez is a 51 y.o. male  : 1975    MRN: [id number]  DATE: 2023  TIME: 6:03 PM    Assessment and Plan   Bronchitis [J40]  1. Bronchitis  azithromycin (ZITHROMAX) 250 mg tablet    benzonatate (TESSALON PERLES) 100 mg capsule    predniSONE 20 mg tablet        Lungs clear on PE and VSS. Given symptom duration x 9 days without improvement, will treat with azithromycin, Tessalon Perles, and oral steroids. Encouraged continued supportive measures. Follow up with PCP in 3-5 days or proceed to emergency department for worsening symptoms. Patient verbalized understanding of instructions given. Patient Instructions     Patient Instructions   Take antibiotic as prescribed  Take oral steroids as prescribed  Continue with supportive measures, OTC Tylenol/Ibuprofen, nasal decongestants, and cough suppressants (Tessalon Perles)   Cool mist humidifiers, throat lozenges, increased fluid intake and rest   Follow up with PCP in 3-5 days  Present to ER if symptoms worsen     Acute Bronchitis   AMBULATORY CARE:   Acute bronchitis  is swelling and irritation in your lungs. It is usually caused by a virus and most often happens in the winter. Bronchitis may also be caused by bacteria or by a chemical irritant, such as smoke. Common symptoms:   Cough that lasts up to 3 weeks    Runny or stuffy nose    Hoarseness, sore throat    Fever    Feeling more tired than usual, and body aches    Wheezing or pain when you breathe or cough    Seek care immediately if:   You cough up blood. Your lips or fingernails turn blue. You feel like you are not getting enough air when you breathe. Call your doctor if:   Your symptoms do not go away or get worse, even after treatment. Your cough does not get better within 4 weeks. You have questions or concerns about your condition or care. Medicines:   You may need any of the following:  Cough suppressants  decrease your urge to cough. Decongestants  help loosen mucus in your lungs and make it easier to cough up. This can help you breathe easier. Inhalers  may be given. Your healthcare provider may give you one or more inhalers to help you breathe easier and cough less. An inhaler gives you medicine to open your airways. Ask your healthcare provider to show you how to use your inhaler correctly. Antiviral medicine  treats infections caused by a virus. Antibiotics  may be given if your bronchitis is caused by bacteria or if you have lung condition. Acetaminophen  decreases pain and fever. It is available without a doctor's order. Ask how much to take and how often to take it. Follow directions. Read the labels of all other medicines you are using to see if they also contain acetaminophen, or ask your doctor or pharmacist. Acetaminophen can cause liver damage if not taken correctly. NSAIDs  help decrease swelling and pain or fever. This medicine is available with or without a doctor's order. NSAIDs can cause stomach bleeding or kidney problems in certain people. If you take blood thinner medicine, always ask your healthcare provider if NSAIDs are safe for you. Always read the medicine label and follow directions. Self-care:   Drink liquids as directed. You may need to drink more liquids than usual to stay hydrated. Ask how much liquid to drink each day and which liquids are best for you. Use a cool mist humidifier. This increases air moisture in your home. This may make it easier for you to breathe and help decrease your cough. Get more rest.  Rest helps your body to heal. Slowly start to do more each day. Rest when you feel it is needed. Prevent acute bronchitis:       Ask about vaccines you may need. Get a flu vaccine each year as soon as recommended, usually in September or October. Ask your healthcare provider if you should also get a pneumonia or COVID-19 vaccine.  Your healthcare provider can tell you if you should also get other vaccines, and when to get them. Prevent the spread of germs. You can decrease your risk for acute bronchitis and other illnesses by doing the following:    Wash your hands often with soap and water. Carry germ-killing hand lotion or gel with you. You can use the lotion or gel to clean your hands when soap and water are not available. Do not touch your eyes, nose, or mouth unless you have washed your hands first.    Always cover your mouth when you cough to prevent the spread of germs. It is best to cough into a tissue or your shirt sleeve instead of into your hand. Ask those around you to cover their mouths when they cough. Try to avoid people who have a cold or the flu. If you are sick, stay away from others as much as possible. Avoid irritants in the air. Avoid chemicals, fumes, and dust. Wear a face mask if you must work around dust or fumes. Stay inside on days when air pollution levels are high. If you have allergies, stay inside when pollen counts are high. Do not use aerosol products, such as spray-on deodorant, bug spray, and hair spray. Do not smoke or be around others who are smoking. Nicotine and other chemicals in cigarettes and cigars can cause lung damage. Ask your healthcare provider for information if you currently smoke and need help to quit. E-cigarettes or smokeless tobacco still contain nicotine. Talk to your healthcare provider before you use these products. Follow up with your doctor as directed:  Write down questions you have so you will remember to ask them during your follow-up visits. © Copyright Leah Vasquez 2023 Information is for End User's use only and may not be sold, redistributed or otherwise used for commercial purposes. The above information is an  only. It is not intended as medical advice for individual conditions or treatments.  Talk to your doctor, nurse or pharmacist before following any medical regimen to see if it is safe and effective for you. Chief Complaint     Chief Complaint   Patient presents with    Cough     C/o cough Onset x9 days ago. History of Present Illness       26-year-old male with a past medical history significant for asthma presents with complaints of ongoing productive cough x 9 days. Denies fever, chills, nasal congestion, vomiting, or diarrhea. No known sick contacts or exposures. Patient has been taking OTC medications without improvement. Evaluated in the ED on 12/9/2023 with negative RVP and chest x-ray. Review of Systems   Review of Systems   Constitutional:  Negative for chills and fever. HENT:  Negative for congestion, ear discharge, ear pain, postnasal drip, rhinorrhea, sore throat, trouble swallowing and voice change. Eyes:  Negative for discharge. Respiratory:  Positive for cough. Negative for shortness of breath and wheezing. Cardiovascular:  Negative for chest pain. Gastrointestinal:  Negative for abdominal pain, diarrhea, nausea and vomiting. Musculoskeletal:  Positive for back pain. Skin:  Negative for rash.          Current Medications       Current Outpatient Medications:     azithromycin (ZITHROMAX) 250 mg tablet, Take 2 tablets today then 1 tablet daily x 4 days, Disp: 6 tablet, Rfl: 0    benzonatate (TESSALON PERLES) 100 mg capsule, Take 1 capsule (100 mg total) by mouth 3 (three) times a day as needed for cough for up to 7 days, Disp: 20 capsule, Rfl: 0    levalbuterol (XOPENEX HFA) 45 mcg/act inhaler, Inhale 1-2 puffs every 4 (four) hours as needed for wheezing, Disp: , Rfl:     predniSONE 20 mg tablet, Take 2 tablets (40 mg total) by mouth daily for 5 days, Disp: 10 tablet, Rfl: 0    Current Allergies     Allergies as of 12/14/2023 - Reviewed 12/14/2023   Allergen Reaction Noted    Other Other (See Comments) 02/24/2017            The following portions of the patient's history were reviewed and updated as appropriate: allergies, current medications, past family history, past medical history, past social history, past surgical history and problem list.     Past Medical History:   Diagnosis Date    Asthma     Kidney stones     Migraines        Past Surgical History:   Procedure Laterality Date    KIDNEY STONE SURGERY      TOOTH EXTRACTION  12/26/2022       Family History   Problem Relation Age of Onset    Lung cancer Mother     Lung cancer Father          Medications have been verified. Objective   /66   Pulse 80   Temp 98.2 °F (36.8 °C)   Resp 18   Ht 5' 4" (1.626 m)   Wt 88.5 kg (195 lb)   SpO2 97%   BMI 33.47 kg/m²   No LMP for male patient. Physical Exam     Physical Exam  Vitals and nursing note reviewed. Constitutional:       General: He is not in acute distress. Appearance: He is not toxic-appearing. HENT:      Head: Normocephalic. Right Ear: Tympanic membrane, ear canal and external ear normal.      Left Ear: Tympanic membrane, ear canal and external ear normal.      Nose: Congestion present. Mouth/Throat:      Mouth: Mucous membranes are moist.      Pharynx: Oropharynx is clear. Eyes:      Conjunctiva/sclera: Conjunctivae normal.   Cardiovascular:      Rate and Rhythm: Normal rate and regular rhythm. Heart sounds: Normal heart sounds. Pulmonary:      Effort: Pulmonary effort is normal. No respiratory distress. Breath sounds: Normal breath sounds. No stridor. No wheezing, rhonchi or rales. Lymphadenopathy:      Cervical: No cervical adenopathy. Skin:     General: Skin is warm and dry. Neurological:      Mental Status: He is alert and oriented to person, place, and time. Gait: Gait is intact.    Psychiatric:         Mood and Affect: Mood normal.         Behavior: Behavior normal.

## 2024-03-25 ENCOUNTER — TELEPHONE (OUTPATIENT)
Dept: FAMILY MEDICINE CLINIC | Facility: CLINIC | Age: 49
End: 2024-03-25

## 2024-03-25 NOTE — TELEPHONE ENCOUNTER
Called Walter to help make his appointment for a colorectal cancer screening. Patient didn't answer and I was unable to leave a voicemail.

## 2024-05-13 ENCOUNTER — OFFICE VISIT (OUTPATIENT)
Dept: FAMILY MEDICINE CLINIC | Facility: CLINIC | Age: 49
End: 2024-05-13
Payer: COMMERCIAL

## 2024-05-13 VITALS
HEIGHT: 64 IN | WEIGHT: 205.03 LBS | DIASTOLIC BLOOD PRESSURE: 63 MMHG | SYSTOLIC BLOOD PRESSURE: 114 MMHG | BODY MASS INDEX: 35 KG/M2 | HEART RATE: 81 BPM | OXYGEN SATURATION: 98 %

## 2024-05-13 DIAGNOSIS — M48.02 CERVICAL SPINAL STENOSIS: ICD-10-CM

## 2024-05-13 DIAGNOSIS — Z00.00 ANNUAL PHYSICAL EXAM: Primary | ICD-10-CM

## 2024-05-13 DIAGNOSIS — G43.109 MIGRAINE WITH AURA AND WITHOUT STATUS MIGRAINOSUS, NOT INTRACTABLE: ICD-10-CM

## 2024-05-13 DIAGNOSIS — Z83.49 FAMILY HISTORY OF THYROID DISEASE IN MOTHER: ICD-10-CM

## 2024-05-13 DIAGNOSIS — L91.8 MULTIPLE ACQUIRED SKIN TAGS: ICD-10-CM

## 2024-05-13 DIAGNOSIS — E66.09 CLASS 2 OBESITY DUE TO EXCESS CALORIES IN ADULT, UNSPECIFIED BMI, UNSPECIFIED WHETHER SERIOUS COMORBIDITY PRESENT: ICD-10-CM

## 2024-05-13 DIAGNOSIS — J45.990 EXERCISE-INDUCED ASTHMA: ICD-10-CM

## 2024-05-13 PROCEDURE — 99213 OFFICE O/P EST LOW 20 MIN: CPT | Performed by: PHYSICIAN ASSISTANT

## 2024-05-13 PROCEDURE — 99396 PREV VISIT EST AGE 40-64: CPT | Performed by: PHYSICIAN ASSISTANT

## 2024-05-13 RX ORDER — BUPROPION HYDROCHLORIDE 100 MG/1
100 TABLET ORAL 2 TIMES DAILY
Start: 2024-05-13 | End: 2024-05-13 | Stop reason: CLARIF

## 2024-05-13 RX ORDER — NALTREXONE HYDROCHLORIDE 50 MG/1
50 TABLET, FILM COATED ORAL DAILY
Start: 2024-05-13 | End: 2024-05-13 | Stop reason: CLARIF
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
## 2024-05-17 ENCOUNTER — OFFICE VISIT (OUTPATIENT)
Dept: FAMILY MEDICINE CLINIC | Facility: CLINIC | Age: 49
End: 2024-05-17
Payer: COMMERCIAL

## 2024-05-17 ENCOUNTER — APPOINTMENT (OUTPATIENT)
Dept: LAB | Facility: CLINIC | Age: 49
End: 2024-05-17
Payer: COMMERCIAL

## 2024-05-17 VITALS
WEIGHT: 200 LBS | SYSTOLIC BLOOD PRESSURE: 125 MMHG | BODY MASS INDEX: 34.33 KG/M2 | OXYGEN SATURATION: 98 % | HEART RATE: 78 BPM | DIASTOLIC BLOOD PRESSURE: 75 MMHG

## 2024-05-17 DIAGNOSIS — S39.92XA TRAUMATIC INJURY OF BACK: ICD-10-CM

## 2024-05-17 DIAGNOSIS — S16.1XXD STRAIN OF NECK MUSCLE, SUBSEQUENT ENCOUNTER: Primary | ICD-10-CM

## 2024-05-17 DIAGNOSIS — Z83.49 FAMILY HISTORY OF THYROID DISEASE IN MOTHER: ICD-10-CM

## 2024-05-17 DIAGNOSIS — M54.42 LEFT-SIDED LOW BACK PAIN WITH LEFT-SIDED SCIATICA, UNSPECIFIED CHRONICITY: ICD-10-CM

## 2024-05-17 DIAGNOSIS — M25.562 ACUTE PAIN OF LEFT KNEE: ICD-10-CM

## 2024-05-17 DIAGNOSIS — M62.838 MUSCLE SPASM: ICD-10-CM

## 2024-05-17 DIAGNOSIS — S06.0X0D CONCUSSION WITHOUT LOSS OF CONSCIOUSNESS, SUBSEQUENT ENCOUNTER: ICD-10-CM

## 2024-05-17 LAB — TSH SERPL DL<=0.05 MIU/L-ACNC: 2.89 UIU/ML (ref 0.45–4.5)

## 2024-05-17 PROCEDURE — 36415 COLL VENOUS BLD VENIPUNCTURE: CPT

## 2024-05-17 PROCEDURE — 84443 ASSAY THYROID STIM HORMONE: CPT

## 2024-05-17 PROCEDURE — 99215 OFFICE O/P EST HI 40 MIN: CPT | Performed by: PHYSICIAN ASSISTANT

## 2024-05-17 RX ORDER — CYCLOBENZAPRINE HCL 10 MG
10 TABLET ORAL 3 TIMES DAILY PRN
Qty: 60 TABLET | Refills: 0 | Status: SHIPPED | OUTPATIENT
Start: 2024-05-17
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
## 2024-05-22 ENCOUNTER — EVALUATION (OUTPATIENT)
Dept: PHYSICAL THERAPY | Facility: CLINIC | Age: 49
End: 2024-05-22
Payer: COMMERCIAL

## 2024-05-22 DIAGNOSIS — S16.1XXD STRAIN OF NECK MUSCLE, SUBSEQUENT ENCOUNTER: Primary | ICD-10-CM

## 2024-05-22 DIAGNOSIS — S06.0X0D CONCUSSION WITHOUT LOSS OF CONSCIOUSNESS, SUBSEQUENT ENCOUNTER: ICD-10-CM

## 2024-05-22 DIAGNOSIS — M54.42 LEFT-SIDED LOW BACK PAIN WITH LEFT-SIDED SCIATICA, UNSPECIFIED CHRONICITY: ICD-10-CM

## 2024-05-22 PROCEDURE — 97110 THERAPEUTIC EXERCISES: CPT | Performed by: PHYSICAL THERAPIST

## 2024-05-22 PROCEDURE — 97162 PT EVAL MOD COMPLEX 30 MIN: CPT | Performed by: PHYSICAL THERAPIST

## 2024-05-29 ENCOUNTER — OFFICE VISIT (OUTPATIENT)
Dept: PHYSICAL THERAPY | Facility: CLINIC | Age: 49
End: 2024-05-29
Payer: COMMERCIAL

## 2024-05-29 DIAGNOSIS — S16.1XXD STRAIN OF NECK MUSCLE, SUBSEQUENT ENCOUNTER: Primary | ICD-10-CM

## 2024-05-29 DIAGNOSIS — M54.42 LEFT-SIDED LOW BACK PAIN WITH LEFT-SIDED SCIATICA, UNSPECIFIED CHRONICITY: ICD-10-CM

## 2024-05-29 DIAGNOSIS — S06.0X0D CONCUSSION WITHOUT LOSS OF CONSCIOUSNESS, SUBSEQUENT ENCOUNTER: ICD-10-CM

## 2024-05-29 PROCEDURE — 97140 MANUAL THERAPY 1/> REGIONS: CPT | Performed by: PHYSICAL THERAPIST

## 2024-05-29 PROCEDURE — 97110 THERAPEUTIC EXERCISES: CPT | Performed by: PHYSICAL THERAPIST

## 2024-05-29 PROCEDURE — 97112 NEUROMUSCULAR REEDUCATION: CPT | Performed by: PHYSICAL THERAPIST
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
## 2024-06-04 ENCOUNTER — RA CDI HCC (OUTPATIENT)
Dept: OTHER | Facility: HOSPITAL | Age: 49
End: 2024-06-04

## 2024-06-05 ENCOUNTER — OFFICE VISIT (OUTPATIENT)
Dept: PHYSICAL THERAPY | Facility: CLINIC | Age: 49
End: 2024-06-05
Payer: COMMERCIAL

## 2024-06-05 DIAGNOSIS — M54.42 LEFT-SIDED LOW BACK PAIN WITH LEFT-SIDED SCIATICA, UNSPECIFIED CHRONICITY: ICD-10-CM

## 2024-06-05 DIAGNOSIS — S16.1XXD STRAIN OF NECK MUSCLE, SUBSEQUENT ENCOUNTER: Primary | ICD-10-CM

## 2024-06-05 DIAGNOSIS — S06.0X0D CONCUSSION WITHOUT LOSS OF CONSCIOUSNESS, SUBSEQUENT ENCOUNTER: ICD-10-CM

## 2024-06-05 PROCEDURE — 97140 MANUAL THERAPY 1/> REGIONS: CPT | Performed by: PHYSICAL THERAPIST

## 2024-06-05 NOTE — PROGRESS NOTES
"Daily Note     Today's date: 2024  Patient name: Walter Pride  : 1975  MRN: [id number]  Referring provider: Ofleia Bradford*  Dx:   Encounter Diagnosis     ICD-10-CM    1. Strain of neck muscle, subsequent encounter  S16.1XXD       2. Left-sided low back pain with left-sided sciatica, unspecified chronicity  M54.42       3. Concussion without loss of consciousness, subsequent encounter  S06.0X0D                      Subjective: Patient arrived reporting migraine/nausea symptoms. Patient reports a work shift beginning at 8:30am, following this session. Patient reports he believes HEP is beneficial and he has seen improvements in neck mobility, but has been skipping his oculomotor exercises.       Objective: See treatment diary below      Assessment: Tolerated treatment well. Held exercises due to exacerbated migraine/nausea. Patient demonstrated moderate symptom relief following manual tx, prior to leaving for work. Educated patient on importance of HEP and efficacy of oculomotor exercises in HEP, and importance of daily adherence. Patient would benefit from continued PT to improve noted deficits in mobility, strength, oculomotor function, balance, and HA/nausea symptoms. Patient would benefit from continued skilled PT to return to PLOF and prevent symptom/deficit interference with work.       Plan: Continue per plan of care.  Progress treatment as tolerated.       Precautions: Concussion      Daily Treatment Diary:      Initial Evaluation Date: 24  Compliance                  Visit Number 1 2  3                 Re-Eval  IE                 MC   Foto Captured Y                                            Manual                      stm -  15min 20min                 sor -                                           Ther-Ex                      C/s arom 20x ea  20x ea  nv                 Lev scap tensioning 4x30\"  4x30\"  nv                 Upper trap " "tensioning 4x30\"  4x30\"  nv                 Chin tucks 20x5\"  20x5\"  nv                 butterflies -                     Doorway stretch -  4x30\"  nv                 Open books -                     Pec stretch                                                                  Neuro Re-Ed                      Pencil pushups   20x nv                 Tandem Stance  1' ea  nv          SL Stance  2 x 30\" ea nv                                    Ther-Act                                                               Modalities                      ifc -            HP  15min pre 10 min pre                                       Pt was treated by Charlie Puente, SPT with direct supervision from Matty Stone, PT.          "

## 2024-06-07 ENCOUNTER — OFFICE VISIT (OUTPATIENT)
Dept: PHYSICAL THERAPY | Facility: CLINIC | Age: 49
End: 2024-06-07
Payer: COMMERCIAL

## 2024-06-07 DIAGNOSIS — S16.1XXD STRAIN OF NECK MUSCLE, SUBSEQUENT ENCOUNTER: Primary | ICD-10-CM

## 2024-06-07 DIAGNOSIS — M54.42 LEFT-SIDED LOW BACK PAIN WITH LEFT-SIDED SCIATICA, UNSPECIFIED CHRONICITY: ICD-10-CM

## 2024-06-07 DIAGNOSIS — S06.0X0D CONCUSSION WITHOUT LOSS OF CONSCIOUSNESS, SUBSEQUENT ENCOUNTER: ICD-10-CM

## 2024-06-07 PROCEDURE — 97140 MANUAL THERAPY 1/> REGIONS: CPT | Performed by: PHYSICAL THERAPIST

## 2024-06-07 PROCEDURE — 97110 THERAPEUTIC EXERCISES: CPT | Performed by: PHYSICAL THERAPIST

## 2024-06-07 NOTE — PROGRESS NOTES
Daily Note     Today's date: 2024  Patient name: Walter Pride  : 1975  MRN: [id number]  Referring provider: Ofelia Bradford*  Dx:   Encounter Diagnosis     ICD-10-CM    1. Strain of neck muscle, subsequent encounter  S16.1XXD       2. Left-sided low back pain with left-sided sciatica, unspecified chronicity  M54.42       3. Concussion without loss of consciousness, subsequent encounter  S06.0X0D           Start Time: 0700  Stop Time: 0755  Total time in clinic (min): 55 minutes    Subjective: Patient reports he is feeling much better than previous visit with no HA or nausea today.       Objective: See treatment diary below      Assessment: Tolerated treatment well with symptom exacerbation not exceeding 3-4/10 with VOR and oculomotor exercises. Required rest intervals due to symptom exacerbation 4/10 x2 during pencil pushup activity and x2 with VOR. Patient demonstrated fatigue post treatment and exhibited good technique with therapeutic exercises, showing improvement in activity tolerance and mobility from previous session as seen in tx diary. Patient ROM and balance continue to progressively improve. Patient would benefit from continued skilled PT to improve deficits in mobility, strength, oculomotor function, and activity tolerance that interfere with function during ADLs and work.       Plan: Continue per plan of care.  Progress treatment as tolerated.  Continue balance and oculomotor progression.      Precautions: Concussion      Daily Treatment Diary:      Initial Evaluation Date: 24  Compliance                Visit Number 1 2  3  4               Re-Eval  IE                 MC   Foto Captured Y                                          Manual                      stm -  15min 20min  15min               sor -                                           Ther-Ex                      C/s arom 20x ea  20x ea  nv  20x               Lev scap  "tensioning 4x30\"  4x30\"  nv  4x30\"               Upper trap tensioning 4x30\"  4x30\"  nv  4x30\"               Chin tucks 20x5\"  20x5\"  nv  20x5\"               butterflies -                     Doorway stretch -  4x30\"  nv  -               Open books -                     Pec stretch                                                                  Neuro Re-Ed                      Pencil pushups   20x nv  20x               Stance ec    1'         Tandem Stance  1' ea  nv 1'         SL Stance  2 x 30\" ea nv 1 x 30\" ea         VOR1    30\" x 2                      Ther-Act                                                               Modalities                      ifc -            HP  15min pre 10 min pre 15 min pre                                      Pt was treated by Charlie Puente, SPT with direct supervision from Matty Stone, PT.            "

## 2024-06-10 ENCOUNTER — OFFICE VISIT (OUTPATIENT)
Dept: FAMILY MEDICINE CLINIC | Facility: CLINIC | Age: 49
End: 2024-06-10
Payer: COMMERCIAL

## 2024-06-10 ENCOUNTER — APPOINTMENT (OUTPATIENT)
Dept: PHYSICAL THERAPY | Facility: CLINIC | Age: 49
End: 2024-06-10
Payer: COMMERCIAL

## 2024-06-10 VITALS
OXYGEN SATURATION: 96 % | SYSTOLIC BLOOD PRESSURE: 118 MMHG | DIASTOLIC BLOOD PRESSURE: 68 MMHG | TEMPERATURE: 97 F | WEIGHT: 199.96 LBS | HEART RATE: 76 BPM | HEIGHT: 64 IN | BODY MASS INDEX: 34.14 KG/M2

## 2024-06-10 DIAGNOSIS — M62.838 MUSCLE SPASM: ICD-10-CM

## 2024-06-10 DIAGNOSIS — G43.109 MIGRAINE WITH AURA AND WITHOUT STATUS MIGRAINOSUS, NOT INTRACTABLE: ICD-10-CM

## 2024-06-10 DIAGNOSIS — M54.2 NECK PAIN, BILATERAL: ICD-10-CM

## 2024-06-10 DIAGNOSIS — S13.4XXD WHIPLASH INJURY TO NECK, SUBSEQUENT ENCOUNTER: ICD-10-CM
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
## 2024-06-10 DIAGNOSIS — M62.830 MUSCLE SPASM OF BACK: ICD-10-CM

## 2024-06-10 DIAGNOSIS — S06.0X0D CONCUSSION WITHOUT LOSS OF CONSCIOUSNESS, SUBSEQUENT ENCOUNTER: ICD-10-CM

## 2024-06-10 DIAGNOSIS — N40.0 ENLARGED PROSTATE WITHOUT LOWER URINARY TRACT SYMPTOMS (LUTS): ICD-10-CM

## 2024-06-10 DIAGNOSIS — M54.42 ACUTE LEFT-SIDED LOW BACK PAIN WITH LEFT-SIDED SCIATICA: ICD-10-CM

## 2024-06-10 DIAGNOSIS — M48.02 CERVICAL SPINAL STENOSIS: ICD-10-CM

## 2024-06-10 PROBLEM — S13.4XXA WHIPLASH INJURY TO NECK: Status: ACTIVE | Noted: 2024-06-10

## 2024-06-10 PROBLEM — S06.0X0A CONCUSSION WITH NO LOSS OF CONSCIOUSNESS: Status: ACTIVE | Noted: 2024-06-10

## 2024-06-10 PROCEDURE — 99214 OFFICE O/P EST MOD 30 MIN: CPT | Performed by: PHYSICIAN ASSISTANT

## 2024-06-10 RX ORDER — CYCLOBENZAPRINE HCL 10 MG
10 TABLET ORAL 3 TIMES DAILY PRN
Qty: 60 TABLET | Refills: 0 | Status: SHIPPED | OUTPATIENT
Start: 2024-06-10
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
## 2024-06-12 ENCOUNTER — APPOINTMENT (OUTPATIENT)
Dept: PHYSICAL THERAPY | Facility: CLINIC | Age: 49
End: 2024-06-12
Payer: COMMERCIAL

## 2024-06-13 ENCOUNTER — OFFICE VISIT (OUTPATIENT)
Dept: PHYSICAL THERAPY | Facility: CLINIC | Age: 49
End: 2024-06-13
Payer: COMMERCIAL

## 2024-06-13 DIAGNOSIS — M54.42 LEFT-SIDED LOW BACK PAIN WITH LEFT-SIDED SCIATICA, UNSPECIFIED CHRONICITY: ICD-10-CM

## 2024-06-13 DIAGNOSIS — S16.1XXD STRAIN OF NECK MUSCLE, SUBSEQUENT ENCOUNTER: Primary | ICD-10-CM

## 2024-06-13 DIAGNOSIS — S06.0X0D CONCUSSION WITHOUT LOSS OF CONSCIOUSNESS, SUBSEQUENT ENCOUNTER: ICD-10-CM

## 2024-06-13 PROCEDURE — 97112 NEUROMUSCULAR REEDUCATION: CPT

## 2024-06-13 PROCEDURE — 97140 MANUAL THERAPY 1/> REGIONS: CPT

## 2024-06-13 NOTE — PROGRESS NOTES
"Daily Note     Today's date: 2024  Patient name: Walter Pride  : 1975  MRN: [id number]  Referring provider: Ofelia Bradford*  Dx:   Encounter Diagnosis     ICD-10-CM    1. Strain of neck muscle, subsequent encounter  S16.1XXD       2. Left-sided low back pain with left-sided sciatica, unspecified chronicity  M54.42       3. Concussion without loss of consciousness, subsequent encounter  S06.0X0D                      Subjective: Pt reports that he has a migraine this morning.  Overall he reports his sx's have been progressing.        Objective: See treatment diary below      Assessment: Tolerated treatment well. Patient would benefit from continued PT.  Pt performed VOR at 60hz today but had difficulty keeping pace.  Progressed balance exercises for eyes closed and single leg stance to foam today.  Pt had no significant LOB but did have significant sway with SL exercises requiring pt to place hand on table briefly for balance and mild sway with EC stance on foam.  Performed tandem stance on floor today.        Plan: Continue per plan of care.      Precautions: Concussion      Daily Treatment Diary:      Initial Evaluation Date: 24  Compliance              Visit Number 1 2  3  4  5             Re-Eval  IE                 MC   Foto Captured Y                                        Manual                      stm -  15min 20min  15min  15min             sor -                                           Ther-Ex                      C/s arom 20x ea  20x ea  nv  20x  20x             Lev scap tensioning 4x30\"  4x30\"  nv  4x30\"  4x30\"             Upper trap tensioning 4x30\"  4x30\"  nv  4x30\"  4x30\"             Chin tucks 20x5\"  20x5\"  nv  20x5\"  20x5\"             butterflies -                     Doorway stretch -  4x30\"  nv  -               Open books -                     Pec stretch                                                       " "           Neuro Re-Ed                      Pencil pushups   20x nv  20x  20x             Stance ec    1' 1' foam        Tandem Stance  1' ea  nv 1' 1' floor        SL Stance  2 x 30\" ea nv 1 x 30\" ea 1x30' ea foam        VOR1    30\" x 2 30\"x2 60hz                     Ther-Act                                                               Modalities                      ifc -            HP  15min pre 10 min pre 15 min pre 15 min pre                                                   "

## 2024-06-14 ENCOUNTER — OFFICE VISIT (OUTPATIENT)
Dept: PHYSICAL THERAPY | Facility: CLINIC | Age: 49
End: 2024-06-14
Payer: COMMERCIAL

## 2024-06-14 ENCOUNTER — APPOINTMENT (OUTPATIENT)
Dept: PHYSICAL THERAPY | Facility: CLINIC | Age: 49
End: 2024-06-14
Payer: COMMERCIAL

## 2024-06-14 DIAGNOSIS — S16.1XXD STRAIN OF NECK MUSCLE, SUBSEQUENT ENCOUNTER: Primary | ICD-10-CM

## 2024-06-14 DIAGNOSIS — M54.42 LEFT-SIDED LOW BACK PAIN WITH LEFT-SIDED SCIATICA, UNSPECIFIED CHRONICITY: ICD-10-CM

## 2024-06-14 DIAGNOSIS — S06.0X0D CONCUSSION WITHOUT LOSS OF CONSCIOUSNESS, SUBSEQUENT ENCOUNTER: ICD-10-CM

## 2024-06-14 PROCEDURE — 97112 NEUROMUSCULAR REEDUCATION: CPT

## 2024-06-14 PROCEDURE — 97110 THERAPEUTIC EXERCISES: CPT

## 2024-06-14 PROCEDURE — 97140 MANUAL THERAPY 1/> REGIONS: CPT
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
## 2024-06-19 ENCOUNTER — OFFICE VISIT (OUTPATIENT)
Dept: PHYSICAL THERAPY | Facility: CLINIC | Age: 49
End: 2024-06-19
Payer: COMMERCIAL

## 2024-06-19 DIAGNOSIS — S16.1XXD STRAIN OF NECK MUSCLE, SUBSEQUENT ENCOUNTER: Primary | ICD-10-CM

## 2024-06-19 DIAGNOSIS — S06.0X0D CONCUSSION WITHOUT LOSS OF CONSCIOUSNESS, SUBSEQUENT ENCOUNTER: ICD-10-CM

## 2024-06-19 DIAGNOSIS — M54.42 LEFT-SIDED LOW BACK PAIN WITH LEFT-SIDED SCIATICA, UNSPECIFIED CHRONICITY: ICD-10-CM

## 2024-06-19 PROCEDURE — 97140 MANUAL THERAPY 1/> REGIONS: CPT | Performed by: PHYSICAL THERAPIST

## 2024-06-19 PROCEDURE — 97110 THERAPEUTIC EXERCISES: CPT | Performed by: PHYSICAL THERAPIST

## 2024-06-19 PROCEDURE — 97112 NEUROMUSCULAR REEDUCATION: CPT | Performed by: PHYSICAL THERAPIST
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
## 2024-06-21 ENCOUNTER — APPOINTMENT (OUTPATIENT)
Dept: PHYSICAL THERAPY | Facility: CLINIC | Age: 49
End: 2024-06-21
Payer: COMMERCIAL

## 2024-06-25 ENCOUNTER — OFFICE VISIT (OUTPATIENT)
Dept: PHYSICAL THERAPY | Facility: CLINIC | Age: 49
End: 2024-06-25
Payer: COMMERCIAL

## 2024-06-25 DIAGNOSIS — S16.1XXD STRAIN OF NECK MUSCLE, SUBSEQUENT ENCOUNTER: Primary | ICD-10-CM

## 2024-06-25 DIAGNOSIS — S06.0X0D CONCUSSION WITHOUT LOSS OF CONSCIOUSNESS, SUBSEQUENT ENCOUNTER: ICD-10-CM

## 2024-06-25 DIAGNOSIS — M54.42 LEFT-SIDED LOW BACK PAIN WITH LEFT-SIDED SCIATICA, UNSPECIFIED CHRONICITY: ICD-10-CM

## 2024-06-25 PROCEDURE — 97140 MANUAL THERAPY 1/> REGIONS: CPT | Performed by: PHYSICAL THERAPIST

## 2024-06-25 PROCEDURE — 97110 THERAPEUTIC EXERCISES: CPT | Performed by: PHYSICAL THERAPIST

## 2024-06-25 PROCEDURE — 97112 NEUROMUSCULAR REEDUCATION: CPT | Performed by: PHYSICAL THERAPIST
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
## 2024-06-27 ENCOUNTER — OFFICE VISIT (OUTPATIENT)
Dept: PHYSICAL THERAPY | Facility: CLINIC | Age: 49
End: 2024-06-27
Payer: COMMERCIAL

## 2024-06-27 DIAGNOSIS — M54.42 LEFT-SIDED LOW BACK PAIN WITH LEFT-SIDED SCIATICA, UNSPECIFIED CHRONICITY: ICD-10-CM

## 2024-06-27 DIAGNOSIS — S16.1XXD STRAIN OF NECK MUSCLE, SUBSEQUENT ENCOUNTER: Primary | ICD-10-CM

## 2024-06-27 DIAGNOSIS — S06.0X0D CONCUSSION WITHOUT LOSS OF CONSCIOUSNESS, SUBSEQUENT ENCOUNTER: ICD-10-CM

## 2024-06-27 PROCEDURE — 97110 THERAPEUTIC EXERCISES: CPT | Performed by: PHYSICAL THERAPIST

## 2024-06-27 PROCEDURE — 97112 NEUROMUSCULAR REEDUCATION: CPT | Performed by: PHYSICAL THERAPIST

## 2024-06-27 PROCEDURE — 97140 MANUAL THERAPY 1/> REGIONS: CPT | Performed by: PHYSICAL THERAPIST
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
## 2024-07-01 ENCOUNTER — OFFICE VISIT (OUTPATIENT)
Dept: PHYSICAL THERAPY | Facility: CLINIC | Age: 49
End: 2024-07-01
Payer: COMMERCIAL

## 2024-07-01 DIAGNOSIS — S16.1XXD STRAIN OF NECK MUSCLE, SUBSEQUENT ENCOUNTER: Primary | ICD-10-CM

## 2024-07-01 DIAGNOSIS — S06.0X0D CONCUSSION WITHOUT LOSS OF CONSCIOUSNESS, SUBSEQUENT ENCOUNTER: ICD-10-CM

## 2024-07-01 DIAGNOSIS — M54.42 LEFT-SIDED LOW BACK PAIN WITH LEFT-SIDED SCIATICA, UNSPECIFIED CHRONICITY: ICD-10-CM

## 2024-07-01 PROCEDURE — 97110 THERAPEUTIC EXERCISES: CPT | Performed by: PHYSICAL THERAPIST

## 2024-07-01 PROCEDURE — 97112 NEUROMUSCULAR REEDUCATION: CPT | Performed by: PHYSICAL THERAPIST

## 2024-07-01 PROCEDURE — 97140 MANUAL THERAPY 1/> REGIONS: CPT | Performed by: PHYSICAL THERAPIST
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
## 2024-07-03 ENCOUNTER — OFFICE VISIT (OUTPATIENT)
Dept: PHYSICAL THERAPY | Facility: CLINIC | Age: 49
End: 2024-07-03
Payer: COMMERCIAL

## 2024-07-03 DIAGNOSIS — S06.0X0D CONCUSSION WITHOUT LOSS OF CONSCIOUSNESS, SUBSEQUENT ENCOUNTER: ICD-10-CM

## 2024-07-03 DIAGNOSIS — S16.1XXD STRAIN OF NECK MUSCLE, SUBSEQUENT ENCOUNTER: Primary | ICD-10-CM

## 2024-07-03 DIAGNOSIS — M54.42 LEFT-SIDED LOW BACK PAIN WITH LEFT-SIDED SCIATICA, UNSPECIFIED CHRONICITY: ICD-10-CM

## 2024-07-03 PROCEDURE — 97112 NEUROMUSCULAR REEDUCATION: CPT | Performed by: PHYSICAL THERAPIST

## 2024-07-03 PROCEDURE — 97140 MANUAL THERAPY 1/> REGIONS: CPT | Performed by: PHYSICAL THERAPIST

## 2024-07-03 PROCEDURE — 97110 THERAPEUTIC EXERCISES: CPT | Performed by: PHYSICAL THERAPIST
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
## 2024-07-08 ENCOUNTER — OFFICE VISIT (OUTPATIENT)
Dept: FAMILY MEDICINE CLINIC | Facility: CLINIC | Age: 49
End: 2024-07-08
Payer: COMMERCIAL

## 2024-07-08 ENCOUNTER — OFFICE VISIT (OUTPATIENT)
Dept: PHYSICAL THERAPY | Facility: CLINIC | Age: 49
End: 2024-07-08
Payer: COMMERCIAL

## 2024-07-08 VITALS
DIASTOLIC BLOOD PRESSURE: 65 MMHG | HEART RATE: 85 BPM | SYSTOLIC BLOOD PRESSURE: 118 MMHG | TEMPERATURE: 97.7 F | OXYGEN SATURATION: 98 % | WEIGHT: 205.25 LBS | BODY MASS INDEX: 35.23 KG/M2

## 2024-07-08 DIAGNOSIS — M54.2 NECK PAIN, BILATERAL: ICD-10-CM

## 2024-07-08 DIAGNOSIS — S06.0X0D CONCUSSION WITHOUT LOSS OF CONSCIOUSNESS, SUBSEQUENT ENCOUNTER: ICD-10-CM

## 2024-07-08 DIAGNOSIS — R20.2 PARESTHESIAS IN LEFT HAND: ICD-10-CM

## 2024-07-08 DIAGNOSIS — M54.42 ACUTE LEFT-SIDED LOW BACK PAIN WITH LEFT-SIDED SCIATICA: ICD-10-CM

## 2024-07-08 DIAGNOSIS — R20.2 PARESTHESIAS IN RIGHT HAND: ICD-10-CM
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
## 2024-07-08 DIAGNOSIS — S16.1XXD STRAIN OF NECK MUSCLE, SUBSEQUENT ENCOUNTER: Primary | ICD-10-CM

## 2024-07-08 DIAGNOSIS — S13.4XXD WHIPLASH INJURY TO NECK, SUBSEQUENT ENCOUNTER: ICD-10-CM

## 2024-07-08 DIAGNOSIS — M54.42 LEFT-SIDED LOW BACK PAIN WITH LEFT-SIDED SCIATICA, UNSPECIFIED CHRONICITY: ICD-10-CM

## 2024-07-08 PROCEDURE — 97140 MANUAL THERAPY 1/> REGIONS: CPT | Performed by: PHYSICAL THERAPIST

## 2024-07-08 PROCEDURE — 97110 THERAPEUTIC EXERCISES: CPT | Performed by: PHYSICAL THERAPIST

## 2024-07-08 PROCEDURE — 99215 OFFICE O/P EST HI 40 MIN: CPT | Performed by: PHYSICIAN ASSISTANT

## 2024-07-08 PROCEDURE — 97112 NEUROMUSCULAR REEDUCATION: CPT | Performed by: PHYSICAL THERAPIST
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
## 2024-07-10 ENCOUNTER — TELEPHONE (OUTPATIENT)
Dept: UROLOGY | Facility: CLINIC | Age: 49
End: 2024-07-10

## 2024-07-10 NOTE — TELEPHONE ENCOUNTER
Pt called stating he received text notification his 8/8/24 appointment was cancelled,I reviewed and rescheduled 09/04/24

## 2024-07-11 ENCOUNTER — OFFICE VISIT (OUTPATIENT)
Dept: PHYSICAL THERAPY | Facility: CLINIC | Age: 49
End: 2024-07-11
Payer: COMMERCIAL

## 2024-07-11 DIAGNOSIS — S06.0X0D CONCUSSION WITHOUT LOSS OF CONSCIOUSNESS, SUBSEQUENT ENCOUNTER: ICD-10-CM

## 2024-07-11 DIAGNOSIS — M54.42 LEFT-SIDED LOW BACK PAIN WITH LEFT-SIDED SCIATICA, UNSPECIFIED CHRONICITY: ICD-10-CM

## 2024-07-11 DIAGNOSIS — S16.1XXD STRAIN OF NECK MUSCLE, SUBSEQUENT ENCOUNTER: Primary | ICD-10-CM

## 2024-07-11 PROCEDURE — 97112 NEUROMUSCULAR REEDUCATION: CPT | Performed by: PHYSICAL THERAPIST

## 2024-07-11 PROCEDURE — 97110 THERAPEUTIC EXERCISES: CPT | Performed by: PHYSICAL THERAPIST

## 2024-07-11 PROCEDURE — 97140 MANUAL THERAPY 1/> REGIONS: CPT | Performed by: PHYSICAL THERAPIST
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
## 2024-07-15 ENCOUNTER — EVALUATION (OUTPATIENT)
Dept: PHYSICAL THERAPY | Facility: CLINIC | Age: 49
End: 2024-07-15
Payer: COMMERCIAL

## 2024-07-15 DIAGNOSIS — S16.1XXD STRAIN OF NECK MUSCLE, SUBSEQUENT ENCOUNTER: Primary | ICD-10-CM

## 2024-07-15 DIAGNOSIS — M54.42 LEFT-SIDED LOW BACK PAIN WITH LEFT-SIDED SCIATICA, UNSPECIFIED CHRONICITY: ICD-10-CM

## 2024-07-15 DIAGNOSIS — S06.0X0D CONCUSSION WITHOUT LOSS OF CONSCIOUSNESS, SUBSEQUENT ENCOUNTER: ICD-10-CM

## 2024-07-15 PROCEDURE — 97112 NEUROMUSCULAR REEDUCATION: CPT | Performed by: PHYSICAL THERAPIST

## 2024-07-15 PROCEDURE — 97140 MANUAL THERAPY 1/> REGIONS: CPT | Performed by: PHYSICAL THERAPIST

## 2024-07-15 PROCEDURE — 97164 PT RE-EVAL EST PLAN CARE: CPT | Performed by: PHYSICAL THERAPIST

## 2024-07-15 PROCEDURE — 97110 THERAPEUTIC EXERCISES: CPT | Performed by: PHYSICAL THERAPIST
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
## 2024-07-18 ENCOUNTER — OFFICE VISIT (OUTPATIENT)
Dept: PHYSICAL THERAPY | Facility: CLINIC | Age: 49
End: 2024-07-18
Payer: COMMERCIAL

## 2024-07-18 DIAGNOSIS — S16.1XXD STRAIN OF NECK MUSCLE, SUBSEQUENT ENCOUNTER: Primary | ICD-10-CM

## 2024-07-18 DIAGNOSIS — S06.0X0D CONCUSSION WITHOUT LOSS OF CONSCIOUSNESS, SUBSEQUENT ENCOUNTER: ICD-10-CM

## 2024-07-18 DIAGNOSIS — M54.42 LEFT-SIDED LOW BACK PAIN WITH LEFT-SIDED SCIATICA, UNSPECIFIED CHRONICITY: ICD-10-CM

## 2024-07-18 PROCEDURE — 97112 NEUROMUSCULAR REEDUCATION: CPT | Performed by: PHYSICAL THERAPIST

## 2024-07-18 PROCEDURE — 97140 MANUAL THERAPY 1/> REGIONS: CPT | Performed by: PHYSICAL THERAPIST

## 2024-07-18 PROCEDURE — 97110 THERAPEUTIC EXERCISES: CPT | Performed by: PHYSICAL THERAPIST

## 2024-07-18 NOTE — PROGRESS NOTES
"Daily Note     Today's date: 2024  Patient name: Walter Pride  : 1975  MRN: [id number]  Referring provider: Ofelia Bradford*  Dx:   Encounter Diagnosis     ICD-10-CM    1. Strain of neck muscle, subsequent encounter  S16.1XXD       2. Left-sided low back pain with left-sided sciatica, unspecified chronicity  M54.42       3. Concussion without loss of consciousness, subsequent encounter  S06.0X0D                      Subjective: Patient reports back/HA sx less bothersome on this date compared to previous session. Reports scheduled for medial branch facet block this weekend.       Objective: See treatment diary below      Assessment: Tolerated treatment well with min sx aggravation from exercise and relief following manual tx prior to session end. Patient demonstrated fatigue post treatment and exhibited good technique with therapeutic exercises. Core progression performed adequately. Educated pt on tra activation with activity to help with back pain is severe and continuation of HEP for neck mobility.       Plan: Continue per plan of care.  Progress treatment as tolerated.       Precautions: Concussion      Daily Treatment Diary:      Initial Evaluation Date: 24  Compliance 7/3 7/8  7/11 7/15 7/18   6/19  6/25  6/27  7/1   Visit Number 11 12  13 14 15   7  8  9  10   Re-Eval             -    MC   Foto Captured            -              7/3 7/8 7/11 7/15 7/18   6/19  6/25  6/27  7/1   Manual                 stm 10' 15' 12' 15' 10'   10'  15' 10'  10'   sor                                  Ther-Ex                 C/s arom 20x 10x ea 20x ea 20x 20x  20x  20x 20x  20x   Lev scap tensioning 2' 2' 2' 2' 2'   2'  3' 3'  2'   Upper trap tensioning 2' 2' 2' 2' 2'   2'  3' 3'  2'   Chin tucks 20x 20x - 10x 10x  20x5\"  20x5\" 20x5\"  10x   DNF Holds 10x 20x 20x 10x 10x  5x 5x 10x 10x   butterflies                 Doorway stretch  2'  1' -         2'   Open books    10x ea -            Pec " "stretch                 Tra Sets 10x c supinemarch 10x c supine march 10x sup march    10x SLR tra 20x paloff red    10x march c tra 20x AE march c tra    20x [place]     10x   ltr 10x10\" 10x10\" 20x 20x 20x     10x   SKTC  10x10\" 10x - 10x        BKFO  10x 10x 10x 10x        Tra c ADD   2' -         Tra c AB   2' -         NuStep 15'  16.5' 15' - 20'  5' c rest intervals  5' c rest 10' L5  16' L4                                 Neuro Re-Ed                 Pencil pushups 20x 20x 20x held 20x   20x  20x  20x  20x   Stance ec       1' narrow    1' eo narrow  1' narrow 1 ea narrow -    Tandem Stance       -  -    AE Stance       1' eo  1' ec 1' eo narrow -    SL Stance       - 1x30\" -    VOR1 30\"x1 70Hz L/R    30\"x1 70Hz U/D Ambulating c head turns   4 laps Ambulating c head turns   4 laps    Rest interval b/n Ambulating c head turns   4 laps    Rest interval b/n Ambulating c head turns   4 laps    Rest interval b/n ea  1' x 2 65Hz 1'x2 2x30\" 2x30\"  70Hz    1x30\" F/E   Saccades 2 point switch 2x30\" - 2x30\" held     2x30\" 2x30\"   Ther-Act                                                          Modalities                 Ifc/HVPC       discuss nv  14' B UT    HP       -                           Patient was treated by Charlie Puente, SPT with the supervision of Matty Stone, PT.     "

## 2024-07-22 ENCOUNTER — OFFICE VISIT (OUTPATIENT)
Dept: PHYSICAL THERAPY | Facility: CLINIC | Age: 49
End: 2024-07-22
Payer: COMMERCIAL

## 2024-07-22 DIAGNOSIS — S16.1XXD STRAIN OF NECK MUSCLE, SUBSEQUENT ENCOUNTER: Primary | ICD-10-CM

## 2024-07-22 DIAGNOSIS — S06.0X0D CONCUSSION WITHOUT LOSS OF CONSCIOUSNESS, SUBSEQUENT ENCOUNTER: ICD-10-CM

## 2024-07-22 DIAGNOSIS — M54.42 LEFT-SIDED LOW BACK PAIN WITH LEFT-SIDED SCIATICA, UNSPECIFIED CHRONICITY: ICD-10-CM

## 2024-07-22 PROCEDURE — 97140 MANUAL THERAPY 1/> REGIONS: CPT | Performed by: PHYSICAL THERAPIST

## 2024-07-22 PROCEDURE — 97110 THERAPEUTIC EXERCISES: CPT | Performed by: PHYSICAL THERAPIST

## 2024-07-22 PROCEDURE — 97112 NEUROMUSCULAR REEDUCATION: CPT | Performed by: PHYSICAL THERAPIST
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
## 2024-07-25 ENCOUNTER — OFFICE VISIT (OUTPATIENT)
Dept: PHYSICAL THERAPY | Facility: CLINIC | Age: 49
End: 2024-07-25
Payer: COMMERCIAL

## 2024-07-25 DIAGNOSIS — S06.0X0D CONCUSSION WITHOUT LOSS OF CONSCIOUSNESS, SUBSEQUENT ENCOUNTER: ICD-10-CM

## 2024-07-25 DIAGNOSIS — S16.1XXD STRAIN OF NECK MUSCLE, SUBSEQUENT ENCOUNTER: Primary | ICD-10-CM

## 2024-07-25 DIAGNOSIS — M54.42 LEFT-SIDED LOW BACK PAIN WITH LEFT-SIDED SCIATICA, UNSPECIFIED CHRONICITY: ICD-10-CM

## 2024-07-25 PROCEDURE — 97112 NEUROMUSCULAR REEDUCATION: CPT | Performed by: PHYSICAL THERAPIST

## 2024-07-25 PROCEDURE — 97140 MANUAL THERAPY 1/> REGIONS: CPT | Performed by: PHYSICAL THERAPIST

## 2024-07-25 PROCEDURE — 97110 THERAPEUTIC EXERCISES: CPT | Performed by: PHYSICAL THERAPIST
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
## 2024-07-29 ENCOUNTER — OFFICE VISIT (OUTPATIENT)
Dept: PHYSICAL THERAPY | Facility: CLINIC | Age: 49
End: 2024-07-29
Payer: COMMERCIAL

## 2024-07-29 DIAGNOSIS — M54.42 LEFT-SIDED LOW BACK PAIN WITH LEFT-SIDED SCIATICA, UNSPECIFIED CHRONICITY: ICD-10-CM

## 2024-07-29 DIAGNOSIS — S16.1XXD STRAIN OF NECK MUSCLE, SUBSEQUENT ENCOUNTER: Primary | ICD-10-CM

## 2024-07-29 DIAGNOSIS — S06.0X0D CONCUSSION WITHOUT LOSS OF CONSCIOUSNESS, SUBSEQUENT ENCOUNTER: ICD-10-CM

## 2024-07-29 PROCEDURE — 97112 NEUROMUSCULAR REEDUCATION: CPT | Performed by: PHYSICAL THERAPIST

## 2024-07-29 PROCEDURE — 97110 THERAPEUTIC EXERCISES: CPT | Performed by: PHYSICAL THERAPIST

## 2024-07-29 PROCEDURE — 97140 MANUAL THERAPY 1/> REGIONS: CPT | Performed by: PHYSICAL THERAPIST
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
## 2024-08-01 ENCOUNTER — APPOINTMENT (OUTPATIENT)
Dept: PHYSICAL THERAPY | Facility: CLINIC | Age: 49
End: 2024-08-01
Payer: COMMERCIAL

## 2024-08-02 ENCOUNTER — OFFICE VISIT (OUTPATIENT)
Dept: PHYSICAL THERAPY | Facility: CLINIC | Age: 49
End: 2024-08-02
Payer: COMMERCIAL

## 2024-08-02 DIAGNOSIS — S16.1XXD STRAIN OF NECK MUSCLE, SUBSEQUENT ENCOUNTER: Primary | ICD-10-CM

## 2024-08-02 DIAGNOSIS — M54.42 LEFT-SIDED LOW BACK PAIN WITH LEFT-SIDED SCIATICA, UNSPECIFIED CHRONICITY: ICD-10-CM

## 2024-08-02 DIAGNOSIS — S06.0X0D CONCUSSION WITHOUT LOSS OF CONSCIOUSNESS, SUBSEQUENT ENCOUNTER: ICD-10-CM

## 2024-08-02 PROCEDURE — 97140 MANUAL THERAPY 1/> REGIONS: CPT

## 2024-08-02 PROCEDURE — 97110 THERAPEUTIC EXERCISES: CPT
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
## 2024-08-05 ENCOUNTER — OFFICE VISIT (OUTPATIENT)
Dept: PHYSICAL THERAPY | Facility: CLINIC | Age: 49
End: 2024-08-05
Payer: COMMERCIAL

## 2024-08-05 DIAGNOSIS — S16.1XXD STRAIN OF NECK MUSCLE, SUBSEQUENT ENCOUNTER: Primary | ICD-10-CM

## 2024-08-05 DIAGNOSIS — S06.0X0D CONCUSSION WITHOUT LOSS OF CONSCIOUSNESS, SUBSEQUENT ENCOUNTER: ICD-10-CM

## 2024-08-05 DIAGNOSIS — M54.42 LEFT-SIDED LOW BACK PAIN WITH LEFT-SIDED SCIATICA, UNSPECIFIED CHRONICITY: ICD-10-CM

## 2024-08-05 PROCEDURE — 97110 THERAPEUTIC EXERCISES: CPT | Performed by: PHYSICAL THERAPIST

## 2024-08-05 PROCEDURE — 97140 MANUAL THERAPY 1/> REGIONS: CPT | Performed by: PHYSICAL THERAPIST
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
## 2024-08-08 ENCOUNTER — OFFICE VISIT (OUTPATIENT)
Dept: UROLOGY | Facility: CLINIC | Age: 49
End: 2024-08-08
Payer: COMMERCIAL

## 2024-08-08 ENCOUNTER — APPOINTMENT (OUTPATIENT)
Dept: PHYSICAL THERAPY | Facility: CLINIC | Age: 49
End: 2024-08-08
Payer: COMMERCIAL

## 2024-08-08 VITALS
OXYGEN SATURATION: 97 % | WEIGHT: 205.2 LBS | HEIGHT: 64 IN | HEART RATE: 83 BPM | DIASTOLIC BLOOD PRESSURE: 86 MMHG | BODY MASS INDEX: 35.03 KG/M2 | TEMPERATURE: 97.6 F | SYSTOLIC BLOOD PRESSURE: 120 MMHG

## 2024-08-08 DIAGNOSIS — N52.8 OTHER MALE ERECTILE DYSFUNCTION: ICD-10-CM

## 2024-08-08 DIAGNOSIS — N40.0 ENLARGED PROSTATE WITHOUT LOWER URINARY TRACT SYMPTOMS (LUTS): Primary | ICD-10-CM

## 2024-08-08 LAB
SL AMB  POCT GLUCOSE, UA: NEGATIVE
SL AMB LEUKOCYTE ESTERASE,UA: NORMAL
SL AMB POCT BILIRUBIN,UA: NEGATIVE
SL AMB POCT BLOOD,UA: NEGATIVE
SL AMB POCT CLARITY,UA: CLEAR
SL AMB POCT COLOR,UA: YELLOW
SL AMB POCT KETONES,UA: NEGATIVE
SL AMB POCT NITRITE,UA: NEGATIVE
SL AMB POCT PH,UA: 6
SL AMB POCT SPECIFIC GRAVITY,UA: 1.02
SL AMB POCT URINE PROTEIN: NEGATIVE
SL AMB POCT UROBILINOGEN: 0.2

## 2024-08-08 PROCEDURE — 81002 URINALYSIS NONAUTO W/O SCOPE: CPT | Performed by: UROLOGY

## 2024-08-08 PROCEDURE — 99204 OFFICE O/P NEW MOD 45 MIN: CPT | Performed by: UROLOGY

## 2024-08-08 RX ORDER — SILDENAFIL 50 MG/1
50 TABLET, FILM COATED ORAL DAILY PRN
Qty: 10 TABLET | Refills: 11 | Status: SHIPPED | OUTPATIENT
Start: 2024-08-08

## 2024-08-08 NOTE — PATIENT INSTRUCTIONS
Have PSA blood test done, make sure you do not have any form of ejaculation for 3 days prior to the test because this can artificially elevate it.  Recommend getting a PSA blood test done every year for prostate cancer screening.    Further.  Sure ejaculation-start taking Viagra 50 mg 1 hour prior to each episode of intercourse/sexual relations.  You do not need to take this every day.

## 2024-08-09 ENCOUNTER — OFFICE VISIT (OUTPATIENT)
Dept: PHYSICAL THERAPY | Facility: CLINIC | Age: 49
End: 2024-08-09
Payer: COMMERCIAL

## 2024-08-09 DIAGNOSIS — M54.42 LEFT-SIDED LOW BACK PAIN WITH LEFT-SIDED SCIATICA, UNSPECIFIED CHRONICITY: ICD-10-CM

## 2024-08-09 DIAGNOSIS — S16.1XXD STRAIN OF NECK MUSCLE, SUBSEQUENT ENCOUNTER: Primary | ICD-10-CM

## 2024-08-09 DIAGNOSIS — S06.0X0D CONCUSSION WITHOUT LOSS OF CONSCIOUSNESS, SUBSEQUENT ENCOUNTER: ICD-10-CM

## 2024-08-09 PROCEDURE — 97110 THERAPEUTIC EXERCISES: CPT | Performed by: PHYSICAL THERAPIST
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
## 2024-08-12 ENCOUNTER — OFFICE VISIT (OUTPATIENT)
Dept: PHYSICAL THERAPY | Facility: CLINIC | Age: 49
End: 2024-08-12
Payer: COMMERCIAL

## 2024-08-12 DIAGNOSIS — M54.42 LEFT-SIDED LOW BACK PAIN WITH LEFT-SIDED SCIATICA, UNSPECIFIED CHRONICITY: ICD-10-CM

## 2024-08-12 DIAGNOSIS — S16.1XXD STRAIN OF NECK MUSCLE, SUBSEQUENT ENCOUNTER: Primary | ICD-10-CM

## 2024-08-12 DIAGNOSIS — S06.0X0D CONCUSSION WITHOUT LOSS OF CONSCIOUSNESS, SUBSEQUENT ENCOUNTER: ICD-10-CM

## 2024-08-12 PROCEDURE — 97140 MANUAL THERAPY 1/> REGIONS: CPT | Performed by: PHYSICAL THERAPIST

## 2024-08-12 PROCEDURE — 97110 THERAPEUTIC EXERCISES: CPT | Performed by: PHYSICAL THERAPIST

## 2024-08-12 PROCEDURE — 97112 NEUROMUSCULAR REEDUCATION: CPT | Performed by: PHYSICAL THERAPIST

## 2024-08-13 NOTE — PROGRESS NOTES
Daily Note     Today's date: 24  Patient name: Walter Pride  : 1975  MRN: [id number]  Referring provider: Ofelia Bradford*  Dx:   Encounter Diagnosis     ICD-10-CM    1. Strain of neck muscle, subsequent encounter  S16.1XXD       2. Left-sided low back pain with left-sided sciatica, unspecified chronicity  M54.42       3. Concussion without loss of consciousness, subsequent encounter  S06.0X0D                      Subjective: Walter notes ocntinued steady trend of improvement, though is admittedly frustrating with waxing.waning nature of migraine headaches. Dizziness and nausea notably improved. He admits to not participating in exertional program as rx.       Objective: See treatment diary below      Assessment: Performed extensive training today on appropriate intensity of exertioinal program for 15' per day. This is research backed treatment for headache treatment trajectory of concussive symptoms as explained to pt. In regards to cervical pain, he continues to do well following genesis. Awaiting l/s genesis as well. Pt. Would benefit from continued PT to maximize function.       Plan: Continue per plan of care.  Progress treatment as tolerated.       Precautions: Concussion      Daily Treatment Diary:      Initial Evaluation Date: 24  Compliance 8/9 8/12  7/15 7/18 7/22 7/25 7/29 8/2 8/6   Visit Number 21 22  14 15 16 17 18 19 20   Re-Eval   -            Foto Captured  -   Y                8/9 8/12  7/15 7/18 7/22 7/25 7/29 8/2 8/6   Manual             stm - 15' c/s  15' 10' 10' 10' 10' 15' 10'   sor -      5'                   Ther-Ex             C/s arom 20x 10x ea  20x 20x 20x 20x 20x ea Held 2* time 20x ea   Lev scap tensioning 2' 2'  2' 2'  2' 2' Held 2* time 2'   Upper trap tensioning 2' 2'  2' 2'  2' 2' Held 2* time 2'   Chin tucks    10x 10x 20x  20x 15x c retraction 15# Held 2* time 15x c 20# retraction   DNF Holds    10x 10x 10x - 10x Held 2* time 10x   butterflies  20x  "          Doorway stretch    1' - 1' -      Open books 10x ea 10x10\"  10x ea -   10x ea 10x ea    Pec stretch On towel roll OH/hugs/angels 5'         On towel roll OH/hugs/angels 5'   Tra Sets 20x AE march c tra    20x Paloff Black c squat   20x paloff red    10x march c tra 20x AE march c tra    20x Paloff Green 20x AE march c tra    20x Paloff Green 20x AE march c tra    30x Paloff Blue    10x march sup c tra    10x SLR c tra    30\" bicycle c ball 20x AE march c tra    30x Paloff Blue c squat    10x SLR c tra    30\" bicycle c ball 20x AE march c tra    20x Paloff Black    15x SLR tra ea 20x AE march c tra    20x Paloff Black c squat    15x SLR c tra ea     Mini Squat  30x       10x    Lateral Stepping  4 laps btb c tra       4 laps BTB    Prone Press Ups         10x    ltr 10x    20x 20x 20x 10x ea 10x ea 10x ea 10x   SKTC    - 10x 1' 1' -     BKFO 20x 20x  10x 10x    20x 20x   Hamstring Stretch 1'            Tra c ADD    -         Tra c AB    -         NuStep 15' L4 15' L4  - 20' 17' L6 20' L5 20' L5 8' - time constraint 15' L5   Exertional   RPE 4-5/10 10'                        Neuro Re-Ed             Pencil pushups    held 20x Julius String 5' Julius String 3' 7cm convergence -    Stance ec             Tandem Stance             AE Stance             SL Stance             VOR1  hep  Ambulating c head turns   4 laps    Rest interval b/n Ambulating c head turns   4 laps    Rest interval b/n ea Ambulating c head turns   4 laps    Rest interval b/n ea Ambulating c head turns   4 laps    Rest interval b/n ea Ambulating c head turns   4 laps    Rest interval b/n ea    70 Hz -    Saccades 2 point switch    held  2x30\"  -     Bird Dogs      10x UL arm c cues See above tra -     Ther-Act                                                      Modalities             Ifc/HVPC             HP                              Patient was treated by Charlie Puente, SPT with the supervision of Matty Stone, PT.       "

## 2024-08-15 ENCOUNTER — APPOINTMENT (OUTPATIENT)
Dept: PHYSICAL THERAPY | Facility: CLINIC | Age: 49
End: 2024-08-15
Payer: COMMERCIAL

## 2024-08-15 ENCOUNTER — TELEPHONE (OUTPATIENT)
Dept: UROLOGY | Facility: CLINIC | Age: 49
End: 2024-08-15

## 2024-08-15 LAB
SL AMB % FREE PSA: 43 %
SL AMB PSA, FREE: 0.27 NG/ML
SL AMB PSA, TOTAL: 0.63 NG/ML

## 2024-08-15 NOTE — RESULT ENCOUNTER NOTE
Please let patient know that his PSA is normal.  He can follow-up as needed per recommendations by Dr. Humphrey.

## 2024-08-15 NOTE — TELEPHONE ENCOUNTER
----- Message from ANTHONY Estrada sent at 8/15/2024  2:49 PM EDT -----  Please let patient know that his PSA is normal.  He can follow-up as needed per recommendations by Dr. Humphrey.

## 2024-08-15 NOTE — TELEPHONE ENCOUNTER
Called patient and was unable to leave a vm. Patient's phone rang once and then phone was disconnected. Will try calling again later.

## 2024-08-16 NOTE — TELEPHONE ENCOUNTER
Called patient and was unable to leave a vm due to the patient's VM box being full. Will try calling again later.

## 2024-08-16 NOTE — TELEPHONE ENCOUNTER
Patient called back and I gave him Dr. Humphrey's recommendation. He said okay but has questions for a clinical person about the sildenafil.     CB: [phone]   30 y/o M presents to ED with diffused abdominal pain. Will get CT with IV contrast, labs provide pain medication and re-asses. 32 y/o M presents to ED with diffused abdominal pain. Will get CT with IV contrast, labs provide pain medication and re-assess  ct + appy  wbc elevated  surgery consult dispo

## 2024-08-16 NOTE — TELEPHONE ENCOUNTER
ANTHONY Donald  [facility] HospitalJust now (1:24 PM)     He will need to ask the provider who performed the procedure if there is any contraindication to using sildenafil.       Called the patient and informed him that he will need to ask the provider who preformed the procedure if there is any contradiction to using sildenafil. Patient confirmed understanding and was thankful for the call.

## 2024-08-16 NOTE — TELEPHONE ENCOUNTER
"Called patient and patient is wondering if he is still able to take the sildenafil after his surgery. I asked patient what surgery he had done and patient stated that he had a \"medial branch surgery on his neck about 1 week ago\". Patient wants to know if this medication is safe to take after the surgery he had. Please advise.     "

## 2024-08-19 ENCOUNTER — OFFICE VISIT (OUTPATIENT)
Dept: PHYSICAL THERAPY | Facility: CLINIC | Age: 49
End: 2024-08-19
Payer: COMMERCIAL

## 2024-08-19 DIAGNOSIS — S16.1XXD STRAIN OF NECK MUSCLE, SUBSEQUENT ENCOUNTER: Primary | ICD-10-CM

## 2024-08-19 DIAGNOSIS — M54.42 LEFT-SIDED LOW BACK PAIN WITH LEFT-SIDED SCIATICA, UNSPECIFIED CHRONICITY: ICD-10-CM

## 2024-08-19 DIAGNOSIS — S06.0X0D CONCUSSION WITHOUT LOSS OF CONSCIOUSNESS, SUBSEQUENT ENCOUNTER: ICD-10-CM

## 2024-08-19 PROCEDURE — 97140 MANUAL THERAPY 1/> REGIONS: CPT

## 2024-08-19 PROCEDURE — 97110 THERAPEUTIC EXERCISES: CPT

## 2024-08-19 PROCEDURE — 97530 THERAPEUTIC ACTIVITIES: CPT

## 2024-08-19 NOTE — PROGRESS NOTES
"Daily Note     Today's date: 2024  Patient name: Walter Pride  : 1975  MRN: [id number]  Referring provider: Ofelia Bradford*  Dx:   Encounter Diagnosis     ICD-10-CM    1. Strain of neck muscle, subsequent encounter  S16.1XXD       2. Left-sided low back pain with left-sided sciatica, unspecified chronicity  M54.42       3. Concussion without loss of consciousness, subsequent encounter  S06.0X0D                      Subjective: Patient reports migraines continue following his OT.      Objective: See treatment diary below      Assessment: Tolerated treatment well without complaint. Patient would benefit from continued PT to reduce neck pain/migraines as well as increase trunk and neck mobility for improved function and activity tolerance.       Plan: Continue per plan of care.      Precautions: Concussion      Daily Treatment Diary:      Initial Evaluation Date: 24  Compliance    Visit Number 21 22 23   16 17 18 19 20   Re-Eval   -           Foto Captured  -                     Manual             stm - 15' c/s 10' Lside L/S, 10 min C/S   10' 10' 10' 15' 10'   sor -      5'                   Ther-Ex             C/s arom 20x 10x ea 10x ea   20x 20x 20x ea Held 2* time 20x ea   Lev scap tensioning 2' 2'     2' 2' Held 2* time 2'   Upper trap tensioning 2' 2'     2' 2' Held 2* time 2'   Chin tucks      20x  20x 15x c retraction 15# Held 2* time 15x c 20# retraction   DNF Holds      10x - 10x Held 2* time 10x   butterflies  20x           Doorway stretch      1' -      Open books 10x ea 10x10\" 10\"x10     10x ea 10x ea    Pec stretch On towel roll OH/hugs/angels 5'         On towel roll OH/hugs/angels 5'   Tra Sets 20x AE march c tra    20x Paloff Black c squat     20x AE march c tra    20x Paloff Green 20x AE march c tra    30x Paloff Blue    10x march sup c tra    10x SLR c tra    30\" bicycle c ball 20x AE " "march c tra    30x Paloff Blue c squat    10x SLR c tra    30\" bicycle c ball 20x AE march c tra    20x Paloff Black    15x SLR tra ea 20x AE march c tra    20x Paloff Black c squat    15x SLR c tra ea     Mini Squat  30x -      10x    Lateral Stepping  4 laps btb c tra -      4 laps BTB    Prone Press Ups         10x    ltr 10x      20x 10x ea 10x ea 10x ea 10x   SKTC      1' 1' -     BKFO 20x 20x 20x      20x 20x   Hamstring Stretch 1'            Tra c ADD             Tra c AB             NuStep 15' L4 15' L4 L4 15'   17' L6 20' L5 20' L5 8' - time constraint 15' L5   Exertional   RPE 4-5/10 10' TM                        Neuro Re-Ed             Pencil pushups      Julius String 5' Julius String 3' 7cm convergence -    Stance ec             Tandem Stance             AE Stance             SL Stance             VOR1  hep    Ambulating c head turns   4 laps    Rest interval b/n ea Ambulating c head turns   4 laps    Rest interval b/n ea Ambulating c head turns   4 laps    Rest interval b/n ea    70 Hz -    Saccades 2 point switch      2x30\"  -     Bird Dogs      10x UL arm c cues See above tra -     Ther-Act                                                      Modalities             Ifc/HVPC             HP                                       "

## 2024-08-22 ENCOUNTER — OFFICE VISIT (OUTPATIENT)
Dept: PHYSICAL THERAPY | Facility: CLINIC | Age: 49
End: 2024-08-22
Payer: COMMERCIAL

## 2024-08-22 DIAGNOSIS — M54.42 LEFT-SIDED LOW BACK PAIN WITH LEFT-SIDED SCIATICA, UNSPECIFIED CHRONICITY: ICD-10-CM

## 2024-08-22 DIAGNOSIS — S16.1XXD STRAIN OF NECK MUSCLE, SUBSEQUENT ENCOUNTER: Primary | ICD-10-CM

## 2024-08-22 DIAGNOSIS — S06.0X0D CONCUSSION WITHOUT LOSS OF CONSCIOUSNESS, SUBSEQUENT ENCOUNTER: ICD-10-CM

## 2024-08-22 PROCEDURE — 97140 MANUAL THERAPY 1/> REGIONS: CPT

## 2024-08-22 PROCEDURE — 97110 THERAPEUTIC EXERCISES: CPT

## 2024-08-22 NOTE — PROGRESS NOTES
"Daily Note     Today's date: 2024  Patient name: Walter Pride  : 1975  MRN: [id number]  Referring provider: Ofelia Bradford*  Dx:   Encounter Diagnosis     ICD-10-CM    1. Strain of neck muscle, subsequent encounter  S16.1XXD       2. Left-sided low back pain with left-sided sciatica, unspecified chronicity  M54.42       3. Concussion without loss of consciousness, subsequent encounter  S06.0X0D                      Subjective: Patient reports he had a pretty bad migraine yesterday and into this morning, but this is not bad at this time. He states he does not notice a pattern as to when her gets these, it still seems random. Denies dizziness lately but does admit to nausea when migraines are really bad.      Objective: See treatment diary below      Assessment: Tolerated treatment well without complaint. Patient had moderate relief following MT per report. Patient would benefit from continued PT to improve function and activity tolerance.      Plan: Continue per plan of care.      Precautions: Concussion      Daily Treatment Diary:      Initial Evaluation Date: 24  Compliance    Visit Number 21 22 23 24  16 17 18 19 20   Re-Eval   -           Foto Captured  -                     Manual             stm - 15' c/s 10' Lside L/S, 10 min C/S 15' c/s  10' 10' 10' 15' 10'   sor -      5'                   Ther-Ex             C/s arom 20x 10x ea 10x ea 10x ea  20x 20x 20x ea Held 2* time 20x ea   Lev scap tensioning 2' 2'  30\"x3   2' 2' Held 2* time 2'   Upper trap tensioning 2' 2'  30\"x3   2' 2' Held 2* time 2'   Chin tucks    5\"x15  20x  20x 15x c retraction 15# Held 2* time 15x c 20# retraction   DNF Holds      10x - 10x Held 2* time 10x   butterflies  20x           Doorway stretch      1' -      Open books 10x ea 10x10\" 10\"x10 10\"x10    10x ea 10x ea    Pec stretch On towel roll OH/jackson/pete 5'     " "    On towel roll OH/hugs/angels 5'   Tra Sets 20x AE march c tra    20x Paloff Black c squat     20x AE march c tra    20x Paloff Green 20x AE march c tra    30x Paloff Blue    10x march sup c tra    10x SLR c tra    30\" bicycle c ball 20x AE march c tra    30x Paloff Blue c squat    10x SLR c tra    30\" bicycle c ball 20x AE march c tra    20x Paloff Black    15x SLR tra ea 20x AE march c tra    20x Paloff Black c squat    15x SLR c tra ea     Mini Squat  30x -      10x    Lateral Stepping  4 laps btb c tra -      4 laps BTB    Prone Press Ups         10x    ltr 10x      20x 10x ea 10x ea 10x ea 10x   SKTC      1' 1' -     BKFO 20x 20x 20x 20x     20x 20x   Hamstring Stretch 1'            Tra c ADD             Tra c AB             NuStep 15' L4 15' L4 L4 15' L4 15'  17' L6 20' L5 20' L5 8' - time constraint 15' L5   Exertional   RPE 4-5/10 10' TM  def                      Neuro Re-Ed             Pencil pushups      Julius String 5' Julius String 3' 7cm convergence -    Stance ec             Tandem Stance             AE Stance             SL Stance             VOR1  hep    Ambulating c head turns   4 laps    Rest interval b/n ea Ambulating c head turns   4 laps    Rest interval b/n ea Ambulating c head turns   4 laps    Rest interval b/n ea    70 Hz -    Saccades 2 point switch      2x30\"  -     Bird Dogs      10x UL arm c cues See above tra -     Ther-Act                                                      Modalities             Ifc/HVPC             HP                                         "

## 2024-08-26 ENCOUNTER — APPOINTMENT (OUTPATIENT)
Dept: PHYSICAL THERAPY | Facility: CLINIC | Age: 49
End: 2024-08-26
Payer: COMMERCIAL

## 2024-08-29 ENCOUNTER — EVALUATION (OUTPATIENT)
Dept: PHYSICAL THERAPY | Facility: CLINIC | Age: 49
End: 2024-08-29
Payer: COMMERCIAL

## 2024-08-29 DIAGNOSIS — S06.0X0D CONCUSSION WITHOUT LOSS OF CONSCIOUSNESS, SUBSEQUENT ENCOUNTER: ICD-10-CM

## 2024-08-29 DIAGNOSIS — S16.1XXD STRAIN OF NECK MUSCLE, SUBSEQUENT ENCOUNTER: Primary | ICD-10-CM

## 2024-08-29 DIAGNOSIS — M54.42 LEFT-SIDED LOW BACK PAIN WITH LEFT-SIDED SCIATICA, UNSPECIFIED CHRONICITY: ICD-10-CM

## 2024-08-29 PROCEDURE — 97140 MANUAL THERAPY 1/> REGIONS: CPT | Performed by: PHYSICAL THERAPIST

## 2024-08-29 PROCEDURE — 97110 THERAPEUTIC EXERCISES: CPT | Performed by: PHYSICAL THERAPIST

## 2024-08-29 PROCEDURE — 97112 NEUROMUSCULAR REEDUCATION: CPT | Performed by: PHYSICAL THERAPIST

## 2024-08-29 NOTE — PROGRESS NOTES
Daily Note/DC Summary    Today's date: 2024  Patient name: Walter Pride  : 1975  MRN: [id number]  Referring provider: Ofelia Bradford*  Dx:   Encounter Diagnosis     ICD-10-CM    1. Strain of neck muscle, subsequent encounter  S16.1XXD       2. Left-sided low back pain with left-sided sciatica, unspecified chronicity  M54.42       3. Concussion without loss of consciousness, subsequent encounter  S06.0X0D                      Subjective: Walter notes ongoing symptoms consist of ocular headaches. He states that his balance is much improved but remains frustrated at frequency of migraines. He denies any correlation to increased stimulus or work and can't notice trend. He admits to good hydration and sleep.   He has been moderately compliant with exertional program.      Objective: See treatment diary below      Assessment: Walter completed exertional return to activity protoocl in PT. Additionally, he is showing improved cervical and lumbar arom with good pain control following pain management. His vestibulra tesitng is WNL with MCTSIB in normal range.   He does continue to have ocular migraines/headaches that are not responding to PT. He is seeing OT for this. Due to isolated symptom and great progress made in PT, recommend DC at this time.   He may f/u prn for ongoing issues.   Was given behavioral optometrist information as requested.       Plan: dc at this time     Precautions: Concussion      Daily Treatment Diary:      Initial Evaluation Date: 24  Compliance    Visit Number 21 22 23 24 25 16 17 18 19 20   Re-Eval   -   -        Foto Captured  -   Y                  Manual             stm - 15' c/s 10' Lside L/S, 10 min C/S 15' c/s 15' 10' 10' 10' 15' 10'   sor -      5'                   Ther-Ex             C/s arom 20x 10x ea 10x ea 10x ea Hep training  x30 20x 20x 20x ea Held  "2* time 20x ea   Lev scap tensioning 2' 2'  30\"x3   2' 2' Held 2* time 2'   Upper trap tensioning 2' 2'  30\"x3   2' 2' Held 2* time 2'   Chin tucks    5\"x15  20x  20x 15x c retraction 15# Held 2* time 15x c 20# retraction   DNF Holds      10x - 10x Held 2* time 10x   butterflies  20x           Doorway stretch      1' -      Open books 10x ea 10x10\" 10\"x10 10\"x10    10x ea 10x ea    Pec stretch On towel roll OH/hugs/angels 5'         On towel roll OH/hugs/angels 5'   Tra Sets 20x AE march c tra    20x Paloff Black c squat     20x AE march c tra    20x Paloff Green 20x AE march c tra    30x Paloff Blue    10x march sup c tra    10x SLR c tra    30\" bicycle c ball 20x AE march c tra    30x Paloff Blue c squat    10x SLR c tra    30\" bicycle c ball 20x AE march c tra    20x Paloff Black    15x SLR tra ea 20x AE march c tra    20x Paloff Black c squat    15x SLR c tra ea     Mini Squat  30x -      10x    Lateral Stepping  4 laps btb c tra -      4 laps BTB    Prone Press Ups         10x    ltr 10x      20x 10x ea 10x ea 10x ea 10x   SKTC      1' 1' -     BKFO 20x 20x 20x 20x     20x 20x   Hamstring Stretch 1'            Tra c ADD             Tra c AB             NuStep 15' L4 15' L4 L4 15' L4 15'  17' L6 20' L5 20' L5 8' - time constraint 15' L5   Exertional   RPE 4-5/10 10' TM  def                      Neuro Re-Ed             Pencil pushups      Julius String 5' Julius String 3' 7cm convergence -    Stance ec             Tandem Stance             AE Stance             SL Stance             VOR1  hep    Ambulating c head turns   4 laps    Rest interval b/n ea Ambulating c head turns   4 laps    Rest interval b/n ea Ambulating c head turns   4 laps    Rest interval b/n ea    70 Hz -    Saccades 2 point switch      2x30\"  -     Bird Dogs      10x UL arm c cues See above tra -     Ther-Act                                                      Modalities             Ifc/HVPC             HP                            "

## 2024-09-02 ENCOUNTER — OFFICE VISIT (OUTPATIENT)
Dept: URGENT CARE | Facility: CLINIC | Age: 49
End: 2024-09-02
Payer: COMMERCIAL

## 2024-09-02 VITALS
SYSTOLIC BLOOD PRESSURE: 118 MMHG | HEIGHT: 64 IN | DIASTOLIC BLOOD PRESSURE: 76 MMHG | OXYGEN SATURATION: 96 % | WEIGHT: 205 LBS | HEART RATE: 96 BPM | RESPIRATION RATE: 16 BRPM | BODY MASS INDEX: 35 KG/M2 | TEMPERATURE: 96.9 F

## 2024-09-02 DIAGNOSIS — B34.9 VIRAL SYNDROME: Primary | ICD-10-CM

## 2024-09-02 PROCEDURE — G0382 LEV 3 HOSP TYPE B ED VISIT: HCPCS

## 2024-09-02 PROCEDURE — S9083 URGENT CARE CENTER GLOBAL: HCPCS

## 2024-09-02 RX ORDER — BENZONATATE 100 MG/1
100 CAPSULE ORAL 3 TIMES DAILY PRN
Qty: 20 CAPSULE | Refills: 0 | Status: SHIPPED | OUTPATIENT
Start: 2024-09-02 | End: 2024-09-09

## 2024-09-02 NOTE — PROGRESS NOTES
"  [facility]        NAME: Walter Pride is a 49 y.o. male  : 1975    MRN: [id number]  DATE: 2024  TIME: 2:07 PM    Assessment and Plan   Viral syndrome [B34.9]  1. Viral syndrome  benzonatate (TESSALON PERLES) 100 mg capsule        Initial viral illness improving aside from lingering cough however yesterday with new viral illness (GI symptoms). Encouraged continued supportive measures.  Tessalon Perles PRN. Follow up with PCP in 3-5 days or proceed to emergency department for worsening symptoms.  Patient verbalized understanding of instructions given.       Patient Instructions     Patient Instructions     Continue with supportive measures, OTC Tylenol/Ibuprofen, nasal decongestants, and cough suppressants (Tessalon Perles)   Cool mist humidifiers, throat lozenges, increased fluid intake and rest   Follow up with PCP in 3-5 days  Present to ER if symptoms worsen       If tests have been performed at [facility], our office will contact you with results if changes need to be made to the care plan discussed with you at the visit.  You can review your full results on [facility] MyChart.      Patient Education     Cough, runny nose, and the common cold   The Basics   Written by the doctors and editors at [facility]   What causes cough, runny nose, and other symptoms of the common cold? -- These symptoms are usually caused by a virus. Doctors also use the term \"viral upper respiratory infection\" or \"viral URI.\" Lots of different viruses can get into your nose, mouth, throat, or airways and cause cold symptoms.  Most people get better from a cold without any lasting problems. Even so, having a cold can be uncomfortable.  What are the symptoms of the common cold? -- Symptoms can include:   Sneezing   Coughing   Sniffling and runny nose   Sore throat   Chest congestion  In children, the common cold can also cause a fever. But adults do not usually get a fever when they have a cold.  Colds " usually last about 3 to 7 days in adults and 10 days in children. But some people have symptoms for up to 2 weeks.  How can I tell if I have a cold or something else? -- Sometimes, it can be hard to tell if you have a cold or something else. Some cold symptoms can also be caused by other illnesses, such as COVID-19, the flu, or strep throat.  There are sometimes clues that can help you tell the difference:   COVID-19 often starts out very similar to a cold, although it can also cause a fever. If you have cold symptoms and have been around someone with COVID-19, you should get a test to find out if you have it, too.   The flu is more likely to cause fever, body aches, and extreme tiredness than a cold.   Strep throat usually causes severe throat pain. It can also cause a fever and swollen glands in the neck. People with strep throat usually do not have other cold symptoms like a stuffy nose or cough.  If you think that you might have an illness other than the common cold, call your doctor or nurse. They can tell you what to do.  Can medicine help with a cold? -- Usually, a cold gets better on its own and does not need treatment. Because colds are usually caused by viruses, antibiotics will not help.  If you are a teen or an adult, you can try cough and cold medicines that you can get without a prescription. These medicines might help with your symptoms. But they can't cure your cold, or help you get well faster.  If you decide to try non-prescription cold medicines:   Read the directions on the label, and follow them carefully.   Do not combine 2 or more medicines that have acetaminophen in them. If you take too much acetaminophen, it can damage your liver.   If you have a heart condition, have high blood pressure, or take any prescription medicines, talk to your doctor or pharmacist before taking cold medicine. They can tell you which medicines are safe.  Some medicines are not safe for children:   If your child is  younger than 6, do not give them any cold medicines. These medicines are not safe for young children. Even if your child is older than 6, cough and cold medicines are unlikely to help.   Never give aspirin to any child younger than 18 years old. In children, aspirin can cause a life-threatening condition called Reye syndrome.   When giving your child acetaminophen or other non-prescription medicines, never give more than the recommended dose.  Is there anything I can do on my own to feel better? -- Yes. You can:   Get plenty of rest.   Drink lots of fluids (water, juice, or broth) to stay hydrated. This will help replace any fluids lost if you have a runny nose or sweating from a fever. Warm tea or soup can help soothe a sore throat.   If the air in your home feels dry, use a cool-mist humidifier. This can help a stuffy nose and make it easier to breathe.   Use saline nose drops or spray to relieve stuffiness.   Avoid smoking, and stay away from places where people are smoking.  Can the common cold lead to more serious problems? -- In some cases, yes. In some people, having a cold can lead to:   Ear infections   Worse asthma symptoms   Sinus infections   Pneumonia or bronchitis (infections of the lungs)  Can colds be prevented? -- There are some things you can do to keep germs from spreading:   Wash your hands with soap and water often (figure 1) - This can also help prevent the spread of other illnesses like the flu and COVID-19.   Cover your cough - Cough into your elbow instead of your hands. Teach children to do this, too. Throw away used tissues right away.   Clean surfaces - The germs that cause the common cold can live on tables, door handles, and other surfaces for at least 2 hours.   Stay home if you are sick - When you do need to be around other people, consider wearing a face mask until you are feeling better.  When should I call the doctor? -- Contact your doctor or nurse if you:   Lose your sense of  taste or smell   Have a fever of more than 100.4°F (38°C) that comes with shaking chills, loss of appetite, or trouble breathing   Have a very bad sore throat   Have a fever and also have lung disease, such as emphysema or asthma   Have a cough that lasts longer than 10 days or starts getting worse   Have chest pain when you cough or breathe deeply, have trouble breathing, or cough up blood  If you are older than 65, or if you have any chronic medical conditions such as diabetes, contact your doctor or nurse any time you get a long-lasting cough.  Take your child to the emergency department if they:   Become confused or stop responding to you   Have trouble breathing or have to work hard to breathe  Contact your child's doctor or nurse if the child:   Loses their sense of taste or smell or won't eat foods that they ate before   Has a very bad sore throat   Refuses to drink anything for a long time   Is younger than 4 months   Has a fever and is not acting like themselves   Has a cough that lasts for more than 2 weeks and is not getting any better or is getting worse   Has a stuffed or runny nose that gets worse or does not get any better after 10 days   Has red eyes or yellow goop coming out of their eyes   Has ear pain, pulls at their ears, or shows other signs of having an ear infection  All topics are updated as new evidence becomes available and our peer review process is complete.  This topic retrieved from [email or web address] on: Feb 26, 2024.  Topic 76302 Version 30.0  Release: 32.2.4 - C32.56  © 2024 UpToDate, Inc. and/or its affiliates. All rights reserved.  figure 1: How to wash your hands     Wet your hands with clean water, and apply a small amount of soap. Lather and rub hands together for at least 20 seconds. Clean your wrists, palms, backs of your hands, between your fingers, tips of your fingers, thumbs, and under and around your nails. Rinse well, and dry your hands using a clean towel.  Graphic 179097 Version  7.0  Consumer Information Use and Disclaimer   Disclaimer: This generalized information is a limited summary of diagnosis, treatment, and/or medication information. It is not meant to be comprehensive and should be used as a tool to help the user understand and/or assess potential diagnostic and treatment options. It does NOT include all information about conditions, treatments, medications, side effects, or risks that may apply to a specific patient. It is not intended to be medical advice or a substitute for the medical advice, diagnosis, or treatment of a health care provider based on the health care provider's examination and assessment of a patient's specific and unique circumstances. Patients must speak with a health care provider for complete information about their health, medical questions, and treatment options, including any risks or benefits regarding use of medications. This information does not endorse any treatments or medications as safe, effective, or approved for treating a specific patient. UpToDate, Inc. and its affiliates disclaim any warranty or liability relating to this information or the use thereof.The use of this information is governed by the Terms of Use, available at https://www.[employer].com/en/know/clinical-effectiveness-terms. 2024© UpToDate, Inc. and its affiliates and/or licensors. All rights reserved.  Copyright   © 2024 UpToDate, Inc. and/or its affiliates. All rights reserved.        Chief Complaint     Chief Complaint   Patient presents with    Cold Like Symptoms     Cough,sore throat and phlegm X 2 weeks .         History of Present Illness       49-year-old male with a past medical history significant for asthma presents with children for complaints of nasal congestion, sore throat, and cough x 2 weeks.  Patient reports symptoms improving aside from lingering cough and then yesterday started with diarrhea.  No fever, chills, or vomiting.  Dates positive sick  contact/exposures as daughter previously ill with URI symptoms and then started yesterday with GI symptoms.        Review of Systems   Review of Systems   Constitutional:  Negative for chills and fever.   HENT:  Positive for congestion, rhinorrhea and sore throat. Negative for ear discharge, ear pain, trouble swallowing and voice change.    Eyes:  Negative for discharge.   Respiratory:  Positive for cough. Negative for shortness of breath and wheezing.    Cardiovascular:  Negative for chest pain.   Gastrointestinal:  Positive for diarrhea. Negative for abdominal pain, nausea and vomiting.   Skin:  Negative for rash.         Current Medications       Current Outpatient Medications:     benzonatate (TESSALON PERLES) 100 mg capsule, Take 1 capsule (100 mg total) by mouth 3 (three) times a day as needed for cough for up to 7 days, Disp: 20 capsule, Rfl: 0    cyclobenzaprine (FLEXERIL) 10 mg tablet, Take 1 tablet (10 mg total) by mouth 3 (three) times a day as needed for muscle spasms (Patient not taking: Reported on 9/2/2024), Disp: 60 tablet, Rfl: 0    levalbuterol (XOPENEX HFA) 45 mcg/act inhaler, Inhale 1-2 puffs every 4 (four) hours as needed for wheezing (Patient not taking: Reported on 8/8/2024), Disp: , Rfl:     sildenafil (VIAGRA) 50 MG tablet, Take 1 tablet (50 mg total) by mouth daily as needed for erectile dysfunction (Patient not taking: Reported on 9/2/2024), Disp: 10 tablet, Rfl: 11    Current Allergies     Allergies as of 09/02/2024 - Reviewed 09/02/2024   Allergen Reaction Noted    Other Other (See Comments) 02/24/2017            The following portions of the patient's history were reviewed and updated as appropriate: allergies, current medications, past family history, past medical history, past social history, past surgical history and problem list.     Past Medical History:   Diagnosis Date    Asthma     Kidney stones     Migraines        Past Surgical History:   Procedure Laterality Date    KIDNEY  "STONE SURGERY      TOOTH EXTRACTION  12/26/2022       Family History   Problem Relation Age of Onset    Lung cancer Mother     Lung cancer Father          Medications have been verified.        Objective   /76   Pulse 96   Temp (!) 96.9 °F (36.1 °C)   Resp 16   Ht 5' 4\" (1.626 m)   Wt 93 kg (205 lb)   SpO2 96%   BMI 35.19 kg/m²   No LMP for male patient.       Physical Exam     Physical Exam  Vitals and nursing note reviewed.   Constitutional:       General: He is not in acute distress.     Appearance: He is not toxic-appearing.   HENT:      Head: Normocephalic.      Right Ear: Tympanic membrane, ear canal and external ear normal.      Left Ear: Tympanic membrane, ear canal and external ear normal.      Nose: Nose normal.      Mouth/Throat:      Mouth: Mucous membranes are moist.      Pharynx: Oropharynx is clear.   Eyes:      Conjunctiva/sclera: Conjunctivae normal.   Cardiovascular:      Rate and Rhythm: Normal rate and regular rhythm.      Heart sounds: Normal heart sounds.   Pulmonary:      Effort: Pulmonary effort is normal. No respiratory distress.      Breath sounds: Normal breath sounds. No stridor. No wheezing, rhonchi or rales.   Lymphadenopathy:      Cervical: No cervical adenopathy.   Skin:     General: Skin is warm and dry.   Neurological:      Mental Status: He is alert and oriented to person, place, and time.      Gait: Gait is intact.   Psychiatric:         Mood and Affect: Mood normal.         Behavior: Behavior normal.                   "

## 2024-09-02 NOTE — PATIENT INSTRUCTIONS
"  Continue with supportive measures, OTC Tylenol/Ibuprofen, nasal decongestants, and cough suppressants (Tessalon Perles)   Cool mist humidifiers, throat lozenges, increased fluid intake and rest   Follow up with PCP in 3-5 days  Present to ER if symptoms worsen       If tests have been performed at [facility], our office will contact you with results if changes need to be made to the care plan discussed with you at the visit.  You can review your full results on [facility].      Patient Education     Cough, runny nose, and the common cold   The Basics   Written by the doctors and editors at [facility]   What causes cough, runny nose, and other symptoms of the common cold? -- These symptoms are usually caused by a virus. Doctors also use the term \"viral upper respiratory infection\" or \"viral URI.\" Lots of different viruses can get into your nose, mouth, throat, or airways and cause cold symptoms.  Most people get better from a cold without any lasting problems. Even so, having a cold can be uncomfortable.  What are the symptoms of the common cold? -- Symptoms can include:   Sneezing   Coughing   Sniffling and runny nose   Sore throat   Chest congestion  In children, the common cold can also cause a fever. But adults do not usually get a fever when they have a cold.  Colds usually last about 3 to 7 days in adults and 10 days in children. But some people have symptoms for up to 2 weeks.  How can I tell if I have a cold or something else? -- Sometimes, it can be hard to tell if you have a cold or something else. Some cold symptoms can also be caused by other illnesses, such as COVID-19, the flu, or strep throat.  There are sometimes clues that can help you tell the difference:   COVID-19 often starts out very similar to a cold, although it can also cause a fever. If you have cold symptoms and have been around someone with COVID-19, you should get a test to find out if you have it, too.   The flu is more likely to " cause fever, body aches, and extreme tiredness than a cold.   Strep throat usually causes severe throat pain. It can also cause a fever and swollen glands in the neck. People with strep throat usually do not have other cold symptoms like a stuffy nose or cough.  If you think that you might have an illness other than the common cold, call your doctor or nurse. They can tell you what to do.  Can medicine help with a cold? -- Usually, a cold gets better on its own and does not need treatment. Because colds are usually caused by viruses, antibiotics will not help.  If you are a teen or an adult, you can try cough and cold medicines that you can get without a prescription. These medicines might help with your symptoms. But they can't cure your cold, or help you get well faster.  If you decide to try non-prescription cold medicines:   Read the directions on the label, and follow them carefully.   Do not combine 2 or more medicines that have acetaminophen in them. If you take too much acetaminophen, it can damage your liver.   If you have a heart condition, have high blood pressure, or take any prescription medicines, talk to your doctor or pharmacist before taking cold medicine. They can tell you which medicines are safe.  Some medicines are not safe for children:   If your child is younger than 6, do not give them any cold medicines. These medicines are not safe for young children. Even if your child is older than 6, cough and cold medicines are unlikely to help.   Never give aspirin to any child younger than 18 years old. In children, aspirin can cause a life-threatening condition called Reye syndrome.   When giving your child acetaminophen or other non-prescription medicines, never give more than the recommended dose.  Is there anything I can do on my own to feel better? -- Yes. You can:   Get plenty of rest.   Drink lots of fluids (water, juice, or broth) to stay hydrated. This will help replace any fluids lost if you  have a runny nose or sweating from a fever. Warm tea or soup can help soothe a sore throat.   If the air in your home feels dry, use a cool-mist humidifier. This can help a stuffy nose and make it easier to breathe.   Use saline nose drops or spray to relieve stuffiness.   Avoid smoking, and stay away from places where people are smoking.  Can the common cold lead to more serious problems? -- In some cases, yes. In some people, having a cold can lead to:   Ear infections   Worse asthma symptoms   Sinus infections   Pneumonia or bronchitis (infections of the lungs)  Can colds be prevented? -- There are some things you can do to keep germs from spreading:   Wash your hands with soap and water often (figure 1) - This can also help prevent the spread of other illnesses like the flu and COVID-19.   Cover your cough - Cough into your elbow instead of your hands. Teach children to do this, too. Throw away used tissues right away.   Clean surfaces - The germs that cause the common cold can live on tables, door handles, and other surfaces for at least 2 hours.   Stay home if you are sick - When you do need to be around other people, consider wearing a face mask until you are feeling better.  When should I call the doctor? -- Contact your doctor or nurse if you:   Lose your sense of taste or smell   Have a fever of more than 100.4°F (38°C) that comes with shaking chills, loss of appetite, or trouble breathing   Have a very bad sore throat   Have a fever and also have lung disease, such as emphysema or asthma   Have a cough that lasts longer than 10 days or starts getting worse   Have chest pain when you cough or breathe deeply, have trouble breathing, or cough up blood  If you are older than 65, or if you have any chronic medical conditions such as diabetes, contact your doctor or nurse any time you get a long-lasting cough.  Take your child to the emergency department if they:   Become confused or stop responding to you    Have trouble breathing or have to work hard to breathe  Contact your child's doctor or nurse if the child:   Loses their sense of taste or smell or won't eat foods that they ate before   Has a very bad sore throat   Refuses to drink anything for a long time   Is younger than 4 months   Has a fever and is not acting like themselves   Has a cough that lasts for more than 2 weeks and is not getting any better or is getting worse   Has a stuffed or runny nose that gets worse or does not get any better after 10 days   Has red eyes or yellow goop coming out of their eyes   Has ear pain, pulls at their ears, or shows other signs of having an ear infection  All topics are updated as new evidence becomes available and our peer review process is complete.  This topic retrieved from [employer] on: Feb 26, 2024.  Topic 75389 Version 30.0  Release: 32.2.4 - C32.56  © 2024 UpToDate, Inc. and/or its affiliates. All rights reserved.  figure 1: How to wash your hands     Wet your hands with clean water, and apply a small amount of soap. Lather and rub hands together for at least 20 seconds. Clean your wrists, palms, backs of your hands, between your fingers, tips of your fingers, thumbs, and under and around your nails. Rinse well, and dry your hands using a clean towel.  Graphic 760213 Version 7.0  Consumer Information Use and Disclaimer   Disclaimer: This generalized information is a limited summary of diagnosis, treatment, and/or medication information. It is not meant to be comprehensive and should be used as a tool to help the user understand and/or assess potential diagnostic and treatment options. It does NOT include all information about conditions, treatments, medications, side effects, or risks that may apply to a specific patient. It is not intended to be medical advice or a substitute for the medical advice, diagnosis, or treatment of a health care provider based on the health care provider's examination and assessment of  a patient's specific and unique circumstances. Patients must speak with a health care provider for complete information about their health, medical questions, and treatment options, including any risks or benefits regarding use of medications. This information does not endorse any treatments or medications as safe, effective, or approved for treating a specific patient. UpToDate, Inc. and its affiliates disclaim any warranty or liability relating to this information or the use thereof.The use of this information is governed by the Terms of Use, available at https://www.[employer]tersB[employer]likeuwer.com/en/know/clinical-effectiveness-terms. 2024© UpToDate, Inc. and its affiliates and/or licensors. All rights reserved.  Copyright   © 2024 UpToDate, Inc. and/or its affiliates. All rights reserved.

## 2024-09-25 ENCOUNTER — TELEPHONE (OUTPATIENT)
Dept: FAMILY MEDICINE CLINIC | Facility: CLINIC | Age: 49
End: 2024-09-25

## 2024-10-08 ENCOUNTER — TELEPHONE (OUTPATIENT)
Dept: FAMILY MEDICINE CLINIC | Facility: CLINIC | Age: 49
End: 2024-10-08
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
## 2024-10-09 ENCOUNTER — OFFICE VISIT (OUTPATIENT)
Dept: FAMILY MEDICINE CLINIC | Facility: CLINIC | Age: 49
End: 2024-10-09
Payer: COMMERCIAL

## 2024-10-09 VITALS
OXYGEN SATURATION: 98 % | SYSTOLIC BLOOD PRESSURE: 115 MMHG | BODY MASS INDEX: 37.31 KG/M2 | TEMPERATURE: 96.7 F | WEIGHT: 217.37 LBS | HEART RATE: 76 BPM | DIASTOLIC BLOOD PRESSURE: 60 MMHG

## 2024-10-09 DIAGNOSIS — G11.9 HYPOMETRIA (HCC): ICD-10-CM

## 2024-10-09 DIAGNOSIS — E66.09 CLASS 2 OBESITY DUE TO EXCESS CALORIES WITHOUT SERIOUS COMORBIDITY WITH BODY MASS INDEX (BMI) OF 37.0 TO 37.9 IN ADULT: ICD-10-CM

## 2024-10-09 DIAGNOSIS — E66.812 CLASS 2 OBESITY DUE TO EXCESS CALORIES WITHOUT SERIOUS COMORBIDITY WITH BODY MASS INDEX (BMI) OF 37.0 TO 37.9 IN ADULT: ICD-10-CM

## 2024-10-09 DIAGNOSIS — N52.8 OTHER MALE ERECTILE DYSFUNCTION: ICD-10-CM

## 2024-10-09 DIAGNOSIS — G43.009 MIGRAINE WITHOUT AURA AND WITHOUT STATUS MIGRAINOSUS, NOT INTRACTABLE: Primary | ICD-10-CM

## 2024-10-09 DIAGNOSIS — J45.990 EXERCISE-INDUCED ASTHMA: ICD-10-CM

## 2024-10-09 DIAGNOSIS — M54.42 CHRONIC LEFT-SIDED LOW BACK PAIN WITH LEFT-SIDED SCIATICA: ICD-10-CM

## 2024-10-09 DIAGNOSIS — G89.29 CHRONIC LEFT-SIDED LOW BACK PAIN WITH LEFT-SIDED SCIATICA: ICD-10-CM

## 2024-10-09 PROCEDURE — 99214 OFFICE O/P EST MOD 30 MIN: CPT | Performed by: PHYSICIAN ASSISTANT

## 2024-10-09 RX ORDER — NALTREXONE HYDROCHLORIDE 50 MG/1
TABLET, FILM COATED ORAL
Qty: 21 TABLET | Refills: 0 | Status: SHIPPED | OUTPATIENT
Start: 2024-10-09

## 2024-10-09 RX ORDER — SILDENAFIL 50 MG/1
50 TABLET, FILM COATED ORAL DAILY PRN
Qty: 10 TABLET | Refills: 11 | Status: SHIPPED | OUTPATIENT
Start: 2024-10-09

## 2024-10-09 RX ORDER — LEVALBUTEROL TARTRATE 45 UG/1
1-2 AEROSOL, METERED ORAL EVERY 4 HOURS PRN
Qty: 15 G | Refills: 0 | Status: SHIPPED | OUTPATIENT
Start: 2024-10-09

## 2024-10-09 RX ORDER — BUPROPION HYDROCHLORIDE 100 MG/1
TABLET ORAL
Qty: 70 TABLET | Refills: 0 | Status: SHIPPED | OUTPATIENT
Start: 2024-10-09
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
## 2024-10-09 NOTE — PATIENT INSTRUCTIONS
Bupropion 100 mg   Week 1: 1 tablet (100 mg) each morning   Week 2: 1 tablet (100 mg) AM and 1 tablet (100 mg) PM   Week 3: 2 tablets (200 mg) AM  and 1 tablet (100 mg) PM   Week 4: 2 tablets (200 mg) AM  and 2 tablets (200 mg) PM     Naltrexone 50 mg   Week 1 & 2: 1/2 tablet (25 mg total) daily   Week 3 and on: 1 tablet (50 mg) daily

## 2024-10-30 DIAGNOSIS — E66.09 CLASS 2 OBESITY DUE TO EXCESS CALORIES WITHOUT SERIOUS COMORBIDITY WITH BODY MASS INDEX (BMI) OF 37.0 TO 37.9 IN ADULT: ICD-10-CM

## 2024-10-30 DIAGNOSIS — E66.812 CLASS 2 OBESITY DUE TO EXCESS CALORIES WITHOUT SERIOUS COMORBIDITY WITH BODY MASS INDEX (BMI) OF 37.0 TO 37.9 IN ADULT: ICD-10-CM

## 2024-10-30 RX ORDER — BUPROPION HYDROCHLORIDE 100 MG/1
TABLET ORAL
Qty: 360 TABLET | Refills: 0 | Status: SHIPPED | OUTPATIENT
Start: 2024-10-30

## 2024-11-11 ENCOUNTER — OFFICE VISIT (OUTPATIENT)
Dept: FAMILY MEDICINE CLINIC | Facility: CLINIC | Age: 49
End: 2024-11-11
Payer: COMMERCIAL

## 2024-11-11 VITALS
DIASTOLIC BLOOD PRESSURE: 67 MMHG | TEMPERATURE: 97.9 F | OXYGEN SATURATION: 98 % | BODY MASS INDEX: 37.05 KG/M2 | SYSTOLIC BLOOD PRESSURE: 124 MMHG | HEIGHT: 64 IN | HEART RATE: 80 BPM | WEIGHT: 217 LBS

## 2024-11-11 DIAGNOSIS — E66.09 CLASS 2 OBESITY DUE TO EXCESS CALORIES WITHOUT SERIOUS COMORBIDITY WITH BODY MASS INDEX (BMI) OF 37.0 TO 37.9 IN ADULT: Primary | ICD-10-CM

## 2024-11-11 DIAGNOSIS — G43.109 MIGRAINE WITH AURA AND WITHOUT STATUS MIGRAINOSUS, NOT INTRACTABLE: ICD-10-CM

## 2024-11-11 DIAGNOSIS — E66.812 CLASS 2 OBESITY DUE TO EXCESS CALORIES WITHOUT SERIOUS COMORBIDITY WITH BODY MASS INDEX (BMI) OF 37.0 TO 37.9 IN ADULT: Primary | ICD-10-CM

## 2024-11-11 PROCEDURE — 99213 OFFICE O/P EST LOW 20 MIN: CPT | Performed by: PHYSICIAN ASSISTANT

## 2024-11-11 NOTE — PROGRESS NOTES
Ambulatory Visit  Name: Walter Pride      : 1975      MRN: [id number]  Encounter Provider: Ofelia Bradford PA-C  Encounter Date: 2024   Encounter department: [facility]    Assessment & Plan  Class 2 obesity due to excess calories without serious comorbidity with body mass index (BMI) of 37.0 to 37.9 in adult  Patient had been prescribed the generic Contrave for weight loss.  He had a lot of questions with regard to mechanism of action and whether this would cause him to become depressed because he saw that bupropion was used for depression.  Cleared up the confusion and went over the mechanism of action of both naltrexone and bupropion.  Went over the titrating regimen and gave him weight loss tips such as using a smaller plate, the lose it jean, and timing 15 minutes before getting seconds.       Migraine with aura and without status migrainosus, not intractable  Patient is concerned that if he does not have sugar that he will get a migraine.  I suggested that if he needs to have some sugar at the end of his meal to prevent a migraine that he should buy the many candy bars and eat only 1.  He will try this to see if he will not start getting migraines as he is trying to lose weight.          History of Present Illness     HPI: 49-year-old male sees me for his primary care services.  He currently weighs 217 and his first goal weight would be 197.  We went over how to download the lose it jean and how to have activity count toward his caloric intake for the day.  I have also suggested that he do some weight training so that he can maintain his muscle mass.    He is interested in weight loss.  He is gained about 15 pounds over the last year unintentionally.  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    He is not having any pain in his chest heart palpitations dizziness or lightheadedness.  No  "constipation diarrhea nausea or vomiting.    History obtained from : patient  Review of Systems: No recent URI or viral syndrome.  Current Outpatient Medications on File Prior to Visit   Medication Sig Dispense Refill    buPROPion (WELLBUTRIN) 100 mg tablet WEEK 1: 1 PILL IN A.M. WEEK 2: 1 PILL IN A.M. AND 1 PILL IN P.M. WEEK 3: 2 PILLS IN A.M. AND 1 PILL IN P.M. WEEK 4: 2 PILLS IN A.M. AND 2 PILLS IN THE P.M. 360 tablet 0    levalbuterol (XOPENEX HFA) 45 mcg/act inhaler Inhale 1-2 puffs every 4 (four) hours as needed for wheezing 15 g 0    naltrexone (REVIA) 50 mg tablet Weeks 1 and 2: 1/2 tab in a.m.; week 3 and thereafter 1 pill in a.m. 21 tablet 0    sildenafil (VIAGRA) 50 MG tablet Take 1 tablet (50 mg total) by mouth daily as needed for erectile dysfunction 10 tablet 11    cyclobenzaprine (FLEXERIL) 10 mg tablet Take 1 tablet (10 mg total) by mouth 3 (three) times a day as needed for muscle spasms (Patient not taking: Reported on 9/2/2024) 60 tablet 0     No current facility-administered medications on file prior to visit.      Social History     Tobacco Use    Smoking status: Never    Smokeless tobacco: Never   Vaping Use    Vaping status: Never Used   Substance and Sexual Activity    Alcohol use: Never    Drug use: Never    Sexual activity: Not Currently         Objective     /67 (BP Location: Right arm, Patient Position: Sitting, Cuff Size: Standard)   Pulse 80   Temp 97.9 °F (36.6 °C) (Tympanic)   Ht 5' 4\" (1.626 m)   Wt 98.4 kg (217 lb)   SpO2 98%   BMI 37.25 kg/m²     Physical Exam: Reviewed vital signs.  He is afebrile appropriate pulse and normotensive.    Heart is regular rate without murmur rub or gallops.  Lungs are clear to auscultation.  Administrative Statements   I have spent a total time of 20 minutes in caring for this patient on the day of the visit/encounter including Risks and benefits of tx options, Instructions for management, Patient and family education, Importance of tx " compliance, Impressions, Documenting in the medical record, Reviewing / ordering tests, medicine, procedures  , and Obtaining or reviewing history  .    I will see the patient in 2 months in order to assess his success in weight loss.  Patient will contact me when he needs a new refill of his prescription medication.  I did talk about the side effects primarily constipation and to use MiraLAX as needed to relieve the constipation.

## 2024-11-11 NOTE — ASSESSMENT & PLAN NOTE
Patient had been prescribed the generic Contrave for weight loss.  He had a lot of questions with regard to mechanism of action and whether this would cause him to become depressed because he saw that bupropion was used for depression.  Cleared up the confusion and went over the mechanism of action of both naltrexone and bupropion.  Went over the titrating regimen and gave him weight loss tips such as using a smaller plate, the lose it jean, and timing 15 minutes before getting seconds.

## 2024-11-11 NOTE — ASSESSMENT & PLAN NOTE
Patient is concerned that if he does not have sugar that he will get a migraine.  I suggested that if he needs to have some sugar at the end of his meal to prevent a migraine that he should buy the many candy bars and eat only 1.  He will try this to see if he will not start getting migraines as he is trying to lose weight.

## 2025-01-16 ENCOUNTER — OFFICE VISIT (OUTPATIENT)
Dept: FAMILY MEDICINE CLINIC | Facility: CLINIC | Age: 50
End: 2025-01-16
Payer: COMMERCIAL

## 2025-01-16 VITALS
DIASTOLIC BLOOD PRESSURE: 57 MMHG | WEIGHT: 210.54 LBS | HEART RATE: 85 BPM | BODY MASS INDEX: 36.14 KG/M2 | TEMPERATURE: 98 F | OXYGEN SATURATION: 98 % | SYSTOLIC BLOOD PRESSURE: 115 MMHG

## 2025-01-16 DIAGNOSIS — E66.812 CLASS 2 OBESITY DUE TO EXCESS CALORIES WITHOUT SERIOUS COMORBIDITY WITH BODY MASS INDEX (BMI) OF 37.0 TO 37.9 IN ADULT: Primary | ICD-10-CM

## 2025-01-16 DIAGNOSIS — E66.09 CLASS 2 OBESITY DUE TO EXCESS CALORIES WITHOUT SERIOUS COMORBIDITY WITH BODY MASS INDEX (BMI) OF 37.0 TO 37.9 IN ADULT: Primary | ICD-10-CM

## 2025-01-16 DIAGNOSIS — S06.0X0S CONCUSSION WITHOUT LOSS OF CONSCIOUSNESS, SEQUELA (HCC): ICD-10-CM

## 2025-01-16 DIAGNOSIS — G43.109 MIGRAINE WITH AURA AND WITHOUT STATUS MIGRAINOSUS, NOT INTRACTABLE: ICD-10-CM

## 2025-01-16 PROBLEM — J45.990 EXERCISE-INDUCED ASTHMA: Status: RESOLVED | Noted: 2023-10-30 | Resolved: 2025-01-16

## 2025-01-16 PROBLEM — G11.9 HYPOMETRIA (HCC): Status: RESOLVED | Noted: 2024-10-09 | Resolved: 2025-01-16

## 2025-01-16 PROCEDURE — 99213 OFFICE O/P EST LOW 20 MIN: CPT | Performed by: PHYSICIAN ASSISTANT
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
## 2025-01-16 NOTE — ASSESSMENT & PLAN NOTE
Patient was previously given the generic equivalent of Contrave.  He has not yet started this medication as he is waiting for what he feels will be the most appropriate time.  He has been having problems with headaches and he wants the headaches to be fully resolved before he starts to take the medication.
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
## 2025-02-02 ENCOUNTER — APPOINTMENT (EMERGENCY)
Dept: RADIOLOGY | Facility: HOSPITAL | Age: 50
End: 2025-02-02
Payer: COMMERCIAL

## 2025-02-02 ENCOUNTER — HOSPITAL ENCOUNTER (EMERGENCY)
Facility: HOSPITAL | Age: 50
Discharge: HOME | End: 2025-02-03
Attending: EMERGENCY MEDICINE
Payer: COMMERCIAL

## 2025-02-02 DIAGNOSIS — B34.9 ACUTE VIRAL SYNDROME: ICD-10-CM

## 2025-02-02 DIAGNOSIS — J06.9 URI WITH COUGH AND CONGESTION: Primary | ICD-10-CM

## 2025-02-02 LAB
ALBUMIN SERPL BCG-MCNC: 4.2 G/DL (ref 3.5–5)
ALP SERPL-CCNC: 89 U/L (ref 34–104)
ALT SERPL W P-5'-P-CCNC: 65 U/L (ref 7–52)
ANION GAP SERPL CALCULATED.3IONS-SCNC: 8 MMOL/L (ref 4–13)
AST SERPL W P-5'-P-CCNC: 45 U/L (ref 13–39)
BASOPHILS # BLD AUTO: 0.03 THOUSANDS/ΜL (ref 0–0.1)
BASOPHILS NFR BLD AUTO: 0 % (ref 0–1)
BILIRUB SERPL-MCNC: 1 MG/DL (ref 0.2–1)
BNP SERPL-MCNC: 27 PG/ML (ref 0–100)
BUN SERPL-MCNC: 13 MG/DL (ref 5–25)
CALCIUM SERPL-MCNC: 9.5 MG/DL (ref 8.4–10.2)
CARDIAC TROPONIN I PNL SERPL HS: <2 NG/L (ref ?–50)
CHLORIDE SERPL-SCNC: 101 MMOL/L (ref 96–108)
CO2 SERPL-SCNC: 26 MMOL/L (ref 21–32)
CREAT SERPL-MCNC: 0.86 MG/DL (ref 0.6–1.3)
D DIMER PPP FEU-MCNC: 0.5 UG/ML FEU
EOSINOPHIL # BLD AUTO: 0.02 THOUSAND/ΜL (ref 0–0.61)
EOSINOPHIL NFR BLD AUTO: 0 % (ref 0–6)
ERYTHROCYTE [DISTWIDTH] IN BLOOD BY AUTOMATED COUNT: 13.2 % (ref 11.6–15.1)
FLUAV AG UPPER RESP QL IA.RAPID: NEGATIVE
FLUBV AG UPPER RESP QL IA.RAPID: NEGATIVE
GFR SERPL CREATININE-BSD FRML MDRD: 101 ML/MIN/1.73SQ M
GLUCOSE SERPL-MCNC: 113 MG/DL (ref 65–140)
HCT VFR BLD AUTO: 41 % (ref 36.5–49.3)
HGB BLD-MCNC: 14.1 G/DL (ref 12–17)
IMM GRANULOCYTES # BLD AUTO: 0.02 THOUSAND/UL (ref 0–0.2)
IMM GRANULOCYTES NFR BLD AUTO: 0 % (ref 0–2)
LACTATE SERPL-SCNC: 1.1 MMOL/L (ref 0.5–2)
LIPASE SERPL-CCNC: 27 U/L (ref 11–82)
LYMPHOCYTES # BLD AUTO: 0.53 THOUSANDS/ΜL (ref 0.6–4.47)
LYMPHOCYTES NFR BLD AUTO: 7 % (ref 14–44)
MCH RBC QN AUTO: 29.4 PG (ref 26.8–34.3)
MCHC RBC AUTO-ENTMCNC: 34.4 G/DL (ref 31.4–37.4)
MCV RBC AUTO: 85 FL (ref 82–98)
MONOCYTES # BLD AUTO: 0.51 THOUSAND/ΜL (ref 0.17–1.22)
MONOCYTES NFR BLD AUTO: 6 % (ref 4–12)
NEUTROPHILS # BLD AUTO: 6.81 THOUSANDS/ΜL (ref 1.85–7.62)
NEUTS SEG NFR BLD AUTO: 87 % (ref 43–75)
NRBC BLD AUTO-RTO: 0 /100 WBCS
PLATELET # BLD AUTO: 189 THOUSANDS/UL (ref 149–390)
PMV BLD AUTO: 11.1 FL (ref 8.9–12.7)
POTASSIUM SERPL-SCNC: 3.5 MMOL/L (ref 3.5–5.3)
PROCALCITONIN SERPL-MCNC: 0.15 NG/ML
PROT SERPL-MCNC: 7.6 G/DL (ref 6.4–8.4)
RBC # BLD AUTO: 4.8 MILLION/UL (ref 3.88–5.62)
SARS-COV+SARS-COV-2 AG RESP QL IA.RAPID: NEGATIVE
SODIUM SERPL-SCNC: 135 MMOL/L (ref 135–147)
WBC # BLD AUTO: 7.92 THOUSAND/UL (ref 4.31–10.16)

## 2025-02-02 PROCEDURE — 96376 TX/PRO/DX INJ SAME DRUG ADON: CPT

## 2025-02-02 PROCEDURE — 85025 COMPLETE CBC W/AUTO DIFF WBC: CPT | Performed by: EMERGENCY MEDICINE

## 2025-02-02 PROCEDURE — 96374 THER/PROPH/DIAG INJ IV PUSH: CPT

## 2025-02-02 PROCEDURE — 83690 ASSAY OF LIPASE: CPT | Performed by: EMERGENCY MEDICINE

## 2025-02-02 PROCEDURE — 96361 HYDRATE IV INFUSION ADD-ON: CPT

## 2025-02-02 PROCEDURE — 93005 ELECTROCARDIOGRAM TRACING: CPT

## 2025-02-02 PROCEDURE — 80053 COMPREHEN METABOLIC PANEL: CPT | Performed by: EMERGENCY MEDICINE

## 2025-02-02 PROCEDURE — 99285 EMERGENCY DEPT VISIT HI MDM: CPT | Performed by: EMERGENCY MEDICINE

## 2025-02-02 PROCEDURE — 83880 ASSAY OF NATRIURETIC PEPTIDE: CPT | Performed by: EMERGENCY MEDICINE

## 2025-02-02 PROCEDURE — 71046 X-RAY EXAM CHEST 2 VIEWS: CPT

## 2025-02-02 PROCEDURE — 85379 FIBRIN DEGRADATION QUANT: CPT | Performed by: EMERGENCY MEDICINE

## 2025-02-02 PROCEDURE — 99285 EMERGENCY DEPT VISIT HI MDM: CPT

## 2025-02-02 PROCEDURE — 36415 COLL VENOUS BLD VENIPUNCTURE: CPT | Performed by: EMERGENCY MEDICINE

## 2025-02-02 PROCEDURE — 84145 PROCALCITONIN (PCT): CPT | Performed by: EMERGENCY MEDICINE

## 2025-02-02 PROCEDURE — 84484 ASSAY OF TROPONIN QUANT: CPT | Performed by: EMERGENCY MEDICINE

## 2025-02-02 PROCEDURE — 96375 TX/PRO/DX INJ NEW DRUG ADDON: CPT

## 2025-02-02 PROCEDURE — 87811 SARS-COV-2 COVID19 W/OPTIC: CPT | Performed by: EMERGENCY MEDICINE

## 2025-02-02 PROCEDURE — 83605 ASSAY OF LACTIC ACID: CPT | Performed by: EMERGENCY MEDICINE

## 2025-02-02 PROCEDURE — 87804 INFLUENZA ASSAY W/OPTIC: CPT | Performed by: EMERGENCY MEDICINE

## 2025-02-02 RX ORDER — ONDANSETRON 2 MG/ML
4 INJECTION INTRAMUSCULAR; INTRAVENOUS ONCE
Status: COMPLETED | OUTPATIENT
Start: 2025-02-02 | End: 2025-02-02

## 2025-02-02 RX ORDER — ACETAMINOPHEN 10 MG/ML
1000 INJECTION, SOLUTION INTRAVENOUS ONCE
Status: COMPLETED | OUTPATIENT
Start: 2025-02-02 | End: 2025-02-03

## 2025-02-02 RX ORDER — KETOROLAC TROMETHAMINE 30 MG/ML
15 INJECTION, SOLUTION INTRAMUSCULAR; INTRAVENOUS ONCE
Status: COMPLETED | OUTPATIENT
Start: 2025-02-02 | End: 2025-02-02

## 2025-02-02 RX ORDER — IPRATROPIUM BROMIDE AND ALBUTEROL SULFATE 2.5; .5 MG/3ML; MG/3ML
3 SOLUTION RESPIRATORY (INHALATION) ONCE
Status: COMPLETED | OUTPATIENT
Start: 2025-02-02 | End: 2025-02-02

## 2025-02-02 RX ADMIN — ONDANSETRON 4 MG: 2 INJECTION INTRAMUSCULAR; INTRAVENOUS at 23:50

## 2025-02-02 RX ADMIN — SODIUM CHLORIDE 1000 ML: 0.9 INJECTION, SOLUTION INTRAVENOUS at 23:52

## 2025-02-02 RX ADMIN — ACETAMINOPHEN 1000 MG: 10 INJECTION, SOLUTION INTRAVENOUS at 23:55

## 2025-02-02 RX ADMIN — KETOROLAC TROMETHAMINE 15 MG: 30 INJECTION, SOLUTION INTRAMUSCULAR; INTRAVENOUS at 23:50

## 2025-02-02 RX ADMIN — SODIUM CHLORIDE 1000 ML: 0.9 INJECTION, SOLUTION INTRAVENOUS at 21:42

## 2025-02-02 RX ADMIN — IPRATROPIUM BROMIDE AND ALBUTEROL SULFATE 3 ML: .5; 3 SOLUTION RESPIRATORY (INHALATION) at 21:44

## 2025-02-02 RX ADMIN — KETOROLAC TROMETHAMINE 15 MG: 30 INJECTION, SOLUTION INTRAMUSCULAR; INTRAVENOUS at 21:41

## 2025-02-02 NOTE — Clinical Note
Walter Pride was seen and treated in our emergency department on 2/2/2025.    No restrictions            Diagnosis:     Walter  may return to work on return date.    He may return on this date: 02/07/2025         If you have any questions or concerns, please don't hesitate to call.      Suhail Stanley RN    ______________________________           _______________          _______________  Hospital Representative                              Date                                Time

## 2025-02-03 VITALS
WEIGHT: 216.71 LBS | BODY MASS INDEX: 37 KG/M2 | SYSTOLIC BLOOD PRESSURE: 93 MMHG | TEMPERATURE: 97.6 F | HEIGHT: 64 IN | RESPIRATION RATE: 18 BRPM | OXYGEN SATURATION: 97 % | DIASTOLIC BLOOD PRESSURE: 52 MMHG | HEART RATE: 80 BPM

## 2025-02-03 LAB
ATRIAL RATE: 100 BPM
P AXIS: 27 DEGREES
PR INTERVAL: 148 MS
QRS AXIS: 2 DEGREES
QRSD INTERVAL: 88 MS
QT INTERVAL: 342 MS
QTC INTERVAL: 441 MS
T WAVE AXIS: 23 DEGREES
VENTRICULAR RATE: 100 BPM

## 2025-02-03 RX ORDER — BENZONATATE 100 MG/1
100 CAPSULE ORAL EVERY 8 HOURS
Qty: 21 CAPSULE | Refills: 0 | Status: SHIPPED | OUTPATIENT
Start: 2025-02-03

## 2025-02-03 RX ORDER — LORATADINE 10 MG/1
10 TABLET ORAL DAILY
Qty: 30 TABLET | Refills: 0 | Status: SHIPPED | OUTPATIENT
Start: 2025-02-03

## 2025-02-03 RX ORDER — FLUTICASONE PROPIONATE 50 MCG
2 SPRAY, SUSPENSION (ML) NASAL DAILY
Qty: 16 G | Refills: 0 | Status: SHIPPED | OUTPATIENT
Start: 2025-02-03

## 2025-02-03 NOTE — ED PROVIDER NOTES
Time reflects when diagnosis was documented in both MDM as applicable and the Disposition within this note       Time User Action Codes Description Comment    2/3/2025  1:49 AM Jesus Duarte Add [J06.9] URI with cough and congestion     2/3/2025  1:49 AM Jesus Duarte Add [B34.9] Acute viral syndrome           ED Disposition       ED Disposition   Discharge    Condition   Stable    Date/Time   Mon Feb 3, 2025  1:49 AM    Comment   Walter Pride discharge to home/self care.                   Assessment & Plan       Medical Decision Making  Patient was seen and evaluated for his presentation as outlined.  Differential diagnosis includes COVID, flu, pneumonia, acute coronary syndrome, other acute cardiopulmonary process, sepsis, other.  Workup as shown.  Negative for COVID and flu.  Chest x-ray clear, no signs of pneumonia.  No concerning blood work abnormalities.  D-dimer within age-adjusted limits.  Medicated as shown with gradual improvement in symptoms.  Overall clinical presentation is consistent with acute viral URI with associated viral syndrome.  Recommended symptomatic treatment.  Reassurance provided.  Stable for discharge from the emergency department.  Advised primary care follow-up as needed.  Return precautions reviewed.  All questions answered.    Amount and/or Complexity of Data Reviewed  Labs: ordered. Decision-making details documented in ED Course.  Radiology: ordered and independent interpretation performed.  ECG/medicine tests: ordered and independent interpretation performed.     Details: EKG by my interpretation demonstrates normal sinus rhythm at a ventricular rate of 100 bpm.  Normal axis, normal intervals.  No acute ST elevations or T wave inversions.  Minimal LVH present.  Compared with prior EKG from December 1, 2021, no significant changes are noted.    Risk  OTC drugs.  Prescription drug management.        ED Course as of 02/04/25 0242   Sun Feb 02, 2025 2229 D-Dimer, Quant(!):  0.50  D-dimer 0.50, at upper limits of normal for reference range.  However, still within age-adjusted cut off.   2337 Headache improved to 7-8/10 from 10/10.  Chest pain improved.  All test results reviewed with patient.  Additional medications for headache ordered.  BP 90s systolic; additional IV fluids ordered.   Mon Feb 03, 2025   0140 Headache improved but still present, feels comfortable going home.       Medications   sodium chloride 0.9 % bolus 1,000 mL (0 mL Intravenous Stopped 2/2/25 2348)   ketorolac (TORADOL) injection 15 mg (15 mg Intravenous Given 2/2/25 2141)   ipratropium-albuterol (DUO-NEB) 0.5-2.5 mg/3 mL inhalation solution 3 mL (3 mL Nebulization Given 2/2/25 2144)   sodium chloride 0.9 % bolus 1,000 mL (0 mL Intravenous Stopped 2/3/25 0117)   ketorolac (TORADOL) injection 15 mg (15 mg Intravenous Given 2/2/25 2350)   acetaminophen (Ofirmev) injection 1,000 mg (0 mg Intravenous Stopped 2/3/25 0010)   ondansetron (ZOFRAN) injection 4 mg (4 mg Intravenous Given 2/2/25 2350)       ED Risk Strat Scores                          SBIRT 22yo+      Flowsheet Row Most Recent Value   Initial Alcohol Screen: US AUDIT-C     1. How often do you have a drink containing alcohol? 0 Filed at: 02/02/2025 2128   2. How many drinks containing alcohol do you have on a typical day you are drinking?  0 Filed at: 02/02/2025 2128   3a. Male UNDER 65: How often do you have five or more drinks on one occasion? 0 Filed at: 02/02/2025 2128   3b. FEMALE Any Age, or MALE 65+: How often do you have 4 or more drinks on one occassion? 0 Filed at: 02/02/2025 2128   Audit-C Score 0 Filed at: 02/02/2025 2128   IRVIN: How many times in the past year have you...    Used an illegal drug or used a prescription medication for non-medical reasons? Never Filed at: 02/02/2025 2128                            History of Present Illness       Chief Complaint   Patient presents with    Chest Pain     Chest pain after coughing and head pain. Pt  reports generalized weakness         Past Medical History:   Diagnosis Date    Asthma     Kidney stones     Migraines       Past Surgical History:   Procedure Laterality Date    KIDNEY STONE SURGERY      TOOTH EXTRACTION  12/26/2022      Family History   Problem Relation Age of Onset    Lung cancer Mother     Lung cancer Father       Social History     Tobacco Use    Smoking status: Never    Smokeless tobacco: Never   Vaping Use    Vaping status: Never Used   Substance Use Topics    Alcohol use: Never    Drug use: Never      E-Cigarette/Vaping    E-Cigarette Use Never User       E-Cigarette/Vaping Substances    Nicotine No     THC No     CBD No     Flavoring No     Other No     Unknown No       I have reviewed and agree with the history as documented.     Patient presents to the emergency department for evaluation of cough, subjective fever, intermittent chest pain with coughing.  Describes the chest pain as a dull ache.  Cough is nonproductive.  Had some diarrhea throughout the week, denies any nausea or vomiting.  No chest pain.  Did not measure his fever at home, has been taking Tylenol with limited improvement.  Has a history of asthma, prescribed a Xopenex inhaler, but has not been using it.  No other complaints, modifying factors, or associated symptoms.        Review of Systems   All other systems reviewed and are negative.          Objective       ED Triage Vitals [02/02/25 2128]   Temperature Pulse Blood Pressure Respirations SpO2 Patient Position - Orthostatic VS   97.6 °F (36.4 °C) (!) 106 107/67 16 93 % Sitting      Temp Source Heart Rate Source BP Location FiO2 (%) Pain Score    Temporal Monitor Left arm -- 5      Vitals      Date and Time Temp Pulse SpO2 Resp BP Pain Score FACES Pain Rating User   02/03/25 0145 -- 80 97 % 18 93/52 -- -- AD   02/03/25 0000 -- 91 96 % 18 94/51 -- -- AD   02/02/25 2350 -- -- -- -- -- 6 -- AD   02/02/25 2300 -- 96 94 % 18 90/52 -- -- AD   02/02/25 2200 -- 105 98 % 18  111/63 -- -- AD   02/02/25 2141 -- -- -- -- -- 5 -- AD   02/02/25 2128 97.6 °F (36.4 °C) 106 93 % 16 107/67 5 -- AD            Physical Exam  Vitals and nursing note reviewed.   Constitutional:       General: He is not in acute distress.     Appearance: He is well-developed.   HENT:      Head: Normocephalic and atraumatic.   Eyes:      Conjunctiva/sclera: Conjunctivae normal.   Cardiovascular:      Rate and Rhythm: Regular rhythm. Tachycardia present.      Heart sounds: No murmur heard.     No friction rub. No gallop.   Pulmonary:      Effort: Pulmonary effort is normal. No respiratory distress.      Breath sounds: Decreased breath sounds present. No wheezing, rhonchi or rales.   Abdominal:      Palpations: Abdomen is soft.      Tenderness: There is abdominal tenderness in the left upper quadrant. There is no guarding or rebound.      Comments: Minimally tender left upper quadrant.   Musculoskeletal:         General: No swelling. Normal range of motion.      Cervical back: Neck supple.   Skin:     General: Skin is warm and dry.      Capillary Refill: Capillary refill takes less than 2 seconds.   Neurological:      General: No focal deficit present.      Mental Status: He is alert and oriented to person, place, and time.   Psychiatric:         Mood and Affect: Mood normal.         Behavior: Behavior normal.         Results Reviewed       Procedure Component Value Units Date/Time    Procalcitonin [[id number]]  (Normal) Collected: 02/02/25 2146    Lab Status: Final result Specimen: Blood from Arm, Right Updated: 02/02/25 2324     Procalcitonin 0.15 ng/ml     Comprehensive metabolic panel [[id number]]  (Abnormal) Collected: 02/02/25 2146    Lab Status: Final result Specimen: Blood from Arm, Right Updated: 02/02/25 2222     Sodium 135 mmol/L      Potassium 3.5 mmol/L      Chloride 101 mmol/L      CO2 26 mmol/L      ANION GAP 8 mmol/L      BUN 13 mg/dL      Creatinine 0.86 mg/dL      Glucose 113 mg/dL      Calcium 9.5 mg/dL       AST 45 U/L      ALT 65 U/L      Alkaline Phosphatase 89 U/L      Total Protein 7.6 g/dL      Albumin 4.2 g/dL      Total Bilirubin 1.00 mg/dL      eGFR 101 ml/min/1.73sq m     Narrative:      National Kid[employer] guidelines for Chronic Kidney Disease (CKD):     Stage 1 with normal or high GFR (GFR > 90 mL/min/1.73 square meters)    Stage 2 Mild CKD (GFR = 60-89 mL/min/1.73 square meters)    Stage 3A Moderate CKD (GFR = 45-59 mL/min/1.73 square meters)    Stage 3B Moderate CKD (GFR = 30-44 mL/min/1.73 square meters)    Stage 4 Severe CKD (GFR = 15-29 mL/min/1.73 square meters)    Stage 5 End Stage CKD (GFR <15 mL/min/1.73 square meters)  Note: GFR calculation is accurate only with a steady state creatinine    Lipase [[id number]]  (Normal) Collected: 02/02/25 2146    Lab Status: Final result Specimen: Blood from Arm, Right Updated: 02/02/25 2222     Lipase 27 u/L     HS Troponin 0hr (reflex protocol) [[id number]]  (Normal) Collected: 02/02/25 2146    Lab Status: Final result Specimen: Blood from Arm, Right Updated: 02/02/25 2221     hs TnI 0hr <2 ng/L     B-Type Natriuretic Peptide(BNP) [[id number]]  (Normal) Collected: 02/02/25 2146    Lab Status: Final result Specimen: Blood from Arm, Right Updated: 02/02/25 2220     BNP 27 pg/mL     Lactic acid, plasma (w/reflex if result > 2.0) [[id number]]  (Normal) Collected: 02/02/25 2146    Lab Status: Final result Specimen: Blood from Arm, Right Updated: 02/02/25 2217     LACTIC ACID 1.1 mmol/L     Narrative:      Result may be elevated if tourniquet was used during collection.    FLU/COVID Rapid Antigen (30 min. TAT) - Preferred screening test in ED [[id number]]  (Normal) Collected: 02/02/25 2146    Lab Status: Final result Specimen: Nares from Nose Updated: 02/02/25 2213     SARS COV Rapid Antigen Negative     Influenza A Rapid Antigen Negative     Influenza B Rapid Antigen Negative    Narrative:      This test has been performed using the [employer]idel Jelena 2  FLU+SARS Antigen test under the Emergency Use Authorization (EUA). This test has been validated by the  and verified by the performing laboratory. The Jelena uses lateral flow immunofluorescent sandwich assay to detect SARS-COV, Influenza A and Influenza B Antigen.     The Quidel Jelena 2 SARS Antigen test does not differentiate between SARS-CoV and SARS-CoV-2.     Negative results are presumptive and may be confirmed with a molecular assay, if necessary, for patient management. Negative results do not rule out SARS-CoV-2 or influenza infection and should not be used as the sole basis for treatment or patient management decisions. A negative test result may occur if the level of antigen in a sample is below the limit of detection of this test.     Positive results are indicative of the presence of viral antigens, but do not rule out bacterial infection or co-infection with other viruses.     All test results should be used as an adjunct to clinical observations and other information available to the provider.    FOR PEDIATRIC PATIENTS - copy/paste COVID Guidelines URL to browser: [email or web address]    D-Dimer [[id number]]  (Abnormal) Collected: 02/02/25 2146    Lab Status: Final result Specimen: Blood from Arm, Right Updated: 02/02/25 2211     D-Dimer, Quant 0.50 ug/ml FEU     Narrative:      In the evaluation for possible pulmonary embolism, in the appropriate (Well's Score of 4 or less) patient, the age adjusted d-dimer cutoff for this patient can be calculated as:    Age x 0.01 (in ug/mL) for Age-adjusted D-dimer exclusion threshold for a patient over 50 years.    CBC and differential [[id number]]  (Abnormal) Collected: 02/02/25 2146    Lab Status: Final result Specimen: Blood from Arm, Right Updated: 02/02/25 2157     WBC 7.92 Thousand/uL      RBC 4.80 Million/uL      Hemoglobin 14.1 g/dL      Hematocrit 41.0 %      MCV 85 fL      MCH 29.4 pg       MCHC 34.4 g/dL      RDW 13.2 %      MPV 11.1 fL      Platelets 189 Thousands/uL      nRBC 0 /100 WBCs      Segmented % 87 %      Immature Grans % 0 %      Lymphocytes % 7 %      Monocytes % 6 %      Eosinophils Relative 0 %      Basophils Relative 0 %      Absolute Neutrophils 6.81 Thousands/µL      Absolute Immature Grans 0.02 Thousand/uL      Absolute Lymphocytes 0.53 Thousands/µL      Absolute Monocytes 0.51 Thousand/µL      Eosinophils Absolute 0.02 Thousand/µL      Basophils Absolute 0.03 Thousands/µL             XR chest 2 views   ED Interpretation by Jesus Duarte MD (02/02 2235)   No acute cardiopulmonary disease appreciated.      Final Interpretation by Joe Quarles MD (02/03 0835)      No acute cardiopulmonary disease.            Workstation performed: [id number]             Procedures    ED Medication and Procedure Management   Prior to Admission Medications   Prescriptions Last Dose Informant Patient Reported? Taking?   buPROPion (WELLBUTRIN) 100 mg tablet   No No   Sig: WEEK 1: 1 PILL IN A.M. WEEK 2: 1 PILL IN A.M. AND 1 PILL IN P.M. WEEK 3: 2 PILLS IN A.M. AND 1 PILL IN P.M. WEEK 4: 2 PILLS IN A.M. AND 2 PILLS IN THE P.M.   levalbuterol (XOPENEX HFA) 45 mcg/act inhaler   No No   Sig: Inhale 1-2 puffs every 4 (four) hours as needed for wheezing   naltrexone (REVIA) 50 mg tablet   No No   Sig: Weeks 1 and 2: 1/2 tab in a.m.; week 3 and thereafter 1 pill in a.m.   sildenafil (VIAGRA) 50 MG tablet   No No   Sig: Take 1 tablet (50 mg total) by mouth daily as needed for erectile dysfunction      Facility-Administered Medications: None     Discharge Medication List as of 2/3/2025  1:50 AM        START taking these medications    Details   benzonatate (TESSALON PERLES) 100 mg capsule Take 1 capsule (100 mg total) by mouth every 8 (eight) hours, Starting Mon 2/3/2025, Normal      fluticasone (FLONASE) 50 mcg/act nasal spray 2 sprays into each nostril daily, Starting Mon 2/3/2025, Normal      loratadine  (CLARITIN) 10 mg tablet Take 1 tablet (10 mg total) by mouth daily, Starting Mon 2/3/2025, Normal           CONTINUE these medications which have NOT CHANGED    Details   buPROPion (WELLBUTRIN) 100 mg tablet WEEK 1: 1 PILL IN A.M. WEEK 2: 1 PILL IN A.M. AND 1 PILL IN P.M. WEEK 3: 2 PILLS IN A.M. AND 1 PILL IN P.M. WEEK 4: 2 PILLS IN A.M. AND 2 PILLS IN THE P.M., Normal      levalbuterol (XOPENEX HFA) 45 mcg/act inhaler Inhale 1-2 puffs every 4 (four) hours as needed for wheezing, Starting Wed 10/9/2024, Normal      naltrexone (REVIA) 50 mg tablet Weeks 1 and 2: 1/2 tab in a.m.; week 3 and thereafter 1 pill in a.m., Normal      sildenafil (VIAGRA) 50 MG tablet Take 1 tablet (50 mg total) by mouth daily as needed for erectile dysfunction, Starting Wed 10/9/2024, Normal           No discharge procedures on file.  ED SEPSIS DOCUMENTATION   Time reflects when diagnosis was documented in both MDM as applicable and the Disposition within this note       Time User Action Codes Description Comment    2/3/2025  1:49 AM Jesus Duarte [J06.9] URI with cough and congestion     2/3/2025  1:49 AM Jesus Duarte [B34.9] Acute viral syndrome                  Jesus Duarte MD  02/04/25 0243

## 2025-04-21 NOTE — ED PROVIDER NOTES
History  Chief Complaint   Patient presents with    Flu Symptoms     pt here 2 days ago for covid symptoms, states continues with fever, generalized bodyaches lamonte in feet, and nausea, denies vomiting/diarrhea     Not COVID vaccinated  Has been sick for the past 2 days  Seen here early yesterday morning and diagnosed with viral syndrome  COVID test pending  Still with fevers and chills  Has nausea  Decreased appetite  Has body aches  Slight cough  No diarrhea  Last time he took anything for symptoms was 2:00 a m  This morning  History provided by:  Patient   used: No    Flu Symptoms  Presenting symptoms: cough, fatigue, fever, headache, myalgias, nausea, rhinorrhea and sore throat    Presenting symptoms: no diarrhea, no shortness of breath and no vomiting    Severity:  Mild  Onset quality:  Gradual  Duration:  2 days  Progression:  Unchanged  Chronicity:  New  Relieved by:  Nothing  Worsened by:  Nothing  Ineffective treatments:  OTC medications  Associated symptoms: chills, decreased appetite, decreased physical activity and nasal congestion    Associated symptoms: no ear pain and no neck stiffness        None       Past Medical History:   Diagnosis Date    Asthma     Kidney stones     Migraines        History reviewed  No pertinent surgical history  History reviewed  No pertinent family history  I have reviewed and agree with the history as documented  E-Cigarette/Vaping    E-Cigarette Use Never User      E-Cigarette/Vaping Substances    Nicotine No     THC No     CBD No     Flavoring No      Social History     Tobacco Use    Smoking status: Never Smoker    Smokeless tobacco: Never Used   Vaping Use    Vaping Use: Never used   Substance Use Topics    Alcohol use: Not Currently    Drug use: Not Currently       Review of Systems   Constitutional: Positive for chills, decreased appetite, fatigue and fever     HENT: Positive for congestion, rhinorrhea and sore Refill requested:Glipizide    Labs: 4/2025  Refill was approved I will continue Glipizide 10 mg 1 tablet with breakfast and 1 tablet before dinner.    Recent Visits  Date Type Provider Dept   03/21/25 Office Visit Arelis Marks MD [facility]   10/15/24 Office Visit Arelis Marks MD [facility]   Showing recent visits within past 365 days with a meds authorizing provider and meeting all other requirements  Future Appointments 9/2023     throat  Negative for ear pain, hearing loss, trouble swallowing and voice change  Eyes: Negative for pain and discharge  Respiratory: Positive for cough  Negative for shortness of breath and wheezing  Cardiovascular: Negative for chest pain and palpitations  Gastrointestinal: Positive for nausea  Negative for abdominal pain, blood in stool, constipation, diarrhea and vomiting  Genitourinary: Negative for dysuria, flank pain, frequency and hematuria  Musculoskeletal: Positive for myalgias  Negative for joint swelling, neck pain and neck stiffness  Skin: Negative for rash and wound  Neurological: Positive for headaches  Negative for dizziness, seizures, syncope and facial asymmetry  Psychiatric/Behavioral: Negative for hallucinations, self-injury and suicidal ideas  All other systems reviewed and are negative  Physical Exam  Physical Exam  Vitals and nursing note reviewed  Constitutional:       General: He is not in acute distress  Appearance: He is well-developed  HENT:      Head: Normocephalic and atraumatic  Right Ear: External ear normal       Left Ear: External ear normal       Mouth/Throat:      Mouth: Mucous membranes are dry  Eyes:      General: No scleral icterus  Right eye: No discharge  Left eye: No discharge  Extraocular Movements: Extraocular movements intact  Conjunctiva/sclera: Conjunctivae normal    Cardiovascular:      Rate and Rhythm: Normal rate and regular rhythm  Heart sounds: Normal heart sounds  No murmur heard  Pulmonary:      Effort: Pulmonary effort is normal       Breath sounds: Normal breath sounds  No wheezing or rales  Abdominal:      General: Bowel sounds are normal  There is no distension  Palpations: Abdomen is soft  Tenderness: There is no abdominal tenderness  There is no guarding or rebound  Musculoskeletal:         General: No deformity  Normal range of motion        Cervical back: Normal range of motion and neck supple  Skin:     General: Skin is warm and dry  Findings: No rash  Neurological:      General: No focal deficit present  Mental Status: He is alert and oriented to person, place, and time  Cranial Nerves: No cranial nerve deficit  Psychiatric:         Mood and Affect: Mood normal          Behavior: Behavior normal          Thought Content:  Thought content normal          Judgment: Judgment normal          Vital Signs  ED Triage Vitals   Temperature Pulse Respirations Blood Pressure SpO2   01/09/22 1043 01/09/22 1043 01/09/22 1043 01/09/22 1043 01/09/22 1043   99 2 °F (37 3 °C) 92 17 118/77 93 %      Temp Source Heart Rate Source Patient Position - Orthostatic VS BP Location FiO2 (%)   01/09/22 1043 01/09/22 1043 01/09/22 1043 01/09/22 1043 --   Temporal Monitor Lying Left arm       Pain Score       01/09/22 1110       10 - Worst Possible Pain           Vitals:    01/09/22 1043 01/09/22 1045 01/09/22 1130   BP: 118/77 118/77 110/69   Pulse: 92 90 82   Patient Position - Orthostatic VS: Lying           Visual Acuity      ED Medications  Medications   sodium chloride 0 9 % bolus 1,500 mL (1,500 mL Intravenous New Bag 1/9/22 1114)   dexamethasone (DECADRON) injection 10 mg (has no administration in time range)   acetaminophen (TYLENOL) tablet 650 mg (650 mg Oral Given 1/9/22 1110)   ketorolac (TORADOL) injection 15 mg (15 mg Intravenous Given 1/9/22 1112)   ondansetron (ZOFRAN) injection 4 mg (4 mg Intravenous Given 1/9/22 1113)       Diagnostic Studies  Results Reviewed     Procedure Component Value Units Date/Time    CKMB [[id number]]  (Normal) Collected: 01/09/22 1105    Lab Status: Final result Specimen: Blood from Arm, Right Updated: 01/09/22 1201     CK-MB Index <1 0 %      CK-MB <1 0 ng/mL     C-reactive protein [[id number]]  (Abnormal) Collected: 01/09/22 1105    Lab Status: Final result Specimen: Blood from Arm, Right Updated: 01/09/22 1200     CRP 11 5 mg/L Comprehensive metabolic panel [[id number]]  (Abnormal) Collected: 01/09/22 1105    Lab Status: Final result Specimen: Blood from Arm, Right Updated: 01/09/22 1138     Sodium 139 mmol/L      Potassium 3 8 mmol/L      Chloride 102 mmol/L      CO2 27 mmol/L      ANION GAP 10 mmol/L      BUN 5 mg/dL      Creatinine 0 98 mg/dL      Glucose 98 mg/dL      Calcium 8 5 mg/dL      AST 56 U/L      ALT 88 U/L      Alkaline Phosphatase 96 U/L      Total Protein 7 4 g/dL      Albumin 3 7 g/dL      Total Bilirubin 0 54 mg/dL      eGFR 91 ml/min/1 73sq m     Narrative:      National Kidney Disease Foundation guidelines for Chronic Kidney Disease (CKD):     Stage 1 with normal or high GFR (GFR > 90 mL/min/1 73 square meters)    Stage 2 Mild CKD (GFR = 60-89 mL/min/1 73 square meters)    Stage 3A Moderate CKD (GFR = 45-59 mL/min/1 73 square meters)    Stage 3B Moderate CKD (GFR = 30-44 mL/min/1 73 square meters)    Stage 4 Severe CKD (GFR = 15-29 mL/min/1 73 square meters)    Stage 5 End Stage CKD (GFR <15 mL/min/1 73 square meters)  Note: GFR calculation is accurate only with a steady state creatinine    CK Total with Reflex CKMB [[id number]]  (Normal) Collected: 01/09/22 1105    Lab Status: Final result Specimen: Blood from Arm, Right Updated: 01/09/22 1138     Total  U/L     CBC and differential [[id number]]  (Abnormal) Collected: 01/09/22 1105    Lab Status: Final result Specimen: Blood from Arm, Right Updated: 01/09/22 1115     WBC 3 39 Thousand/uL      RBC 4 75 Million/uL      Hemoglobin 13 9 g/dL      Hematocrit 41 2 %      MCV 87 fL      MCH 29 3 pg      MCHC 33 7 g/dL      RDW 12 8 %      MPV 10 6 fL      Platelets 812 Thousands/uL      nRBC 0 /100 WBCs      Neutrophils Relative 66 %      Immat GRANS % 0 %      Lymphocytes Relative 23 %      Monocytes Relative 10 %      Eosinophils Relative 0 %      Basophils Relative 1 %      Neutrophils Absolute 2 24 Thousands/µL      Immature Grans Absolute 0 00 Thousand/uL Lymphocytes Absolute 0 79 Thousands/µL      Monocytes Absolute 0 33 Thousand/µL      Eosinophils Absolute 0 01 Thousand/µL      Basophils Absolute 0 02 Thousands/µL                  XR chest 1 view portable   ED Interpretation by Artis Baca MD (01/09 1129)   Bilateral infiltrates      Final Result by Henrik Jett MD (01/09 1200)      No active pulmonary disease on examination which is somewhat limited secondary to low lung volumes  Workstation performed: [id number]                    Procedures  Procedures         ED Course  ED Course as of 01/09/22 1207   Chaparrita Henry Jan 09, 2022   1048 Patient is nontoxic appearing  There is no photophobia or meningismus  Afebrile here  Not tachycardic  Appears slightly dry on exam 1 and looking at his oropharynx  Not tachypneic  Room air pulse ox is 93%  0660 206 71 56 Ambulated in the avina without any difficulty  Room air pulse ox remained 94-95%  SBIRT 22yo+      Most Recent Value   SBIRT (22 yo +)    In order to provide better care to our patients, we are screening all of our patients for alcohol and drug use  Would it be okay to ask you these screening questions? Yes Filed at: 01/09/2022 1046   Initial Alcohol Screen: US AUDIT-C     1  How often do you have a drink containing alcohol? 0 Filed at: 01/09/2022 1046   2  How many drinks containing alcohol do you have on a typical day you are drinking? 0 Filed at: 01/09/2022 1046   3a  Male UNDER 65: How often do you have five or more drinks on one occasion? 0 Filed at: 01/09/2022 1046   3b  FEMALE Any Age, or MALE 65+: How often do you have 4 or more drinks on one occassion? 0 Filed at: 01/09/2022 1046   Audit-C Score 0 Filed at: 01/09/2022 1046   IRVIN: How many times in the past year have you    Used an illegal drug or used a prescription medication for non-medical reasons?  Never Filed at: 01/09/2022 1046                    MDM  Number of Diagnoses or Management Options     Amount and/or Complexity of Data Reviewed  Clinical lab tests: reviewed  Review and summarize past medical records: yes        Disposition  Final diagnoses:   Viral syndrome     Time reflects when diagnosis was documented in both MDM as applicable and the Disposition within this note     Time User Action Codes Description Comment    1/9/2022 10:47 AM Ella White Add [B34 9] Viral syndrome     1/9/2022 11:34 AM Jocelyn White Add [J18 9] Pneumonia of both lungs due to infectious organism, unspecified part of lung     1/9/2022 11:34 AM Jocelyn White Modify [J18 9] Pneumonia of both lungs due to infectious organism, unspecified part of lung most likely viral etiology    1/9/2022 12:03 PM Ella White Remove [J18 9] Pneumonia of both lungs due to infectious organism, unspecified part of lung most likely viral etiology      ED Disposition     ED Disposition Condition Date/Time Comment    Discharge Stable Sun Jan 9, 2022 10:47  [address] discharge to home/self care  Follow-up Information     Follow up With Specialties Details Why [address] Acute Pain Service Call in 2 days  [address] [address] [address] [facility] Acute Pain Service Call in 2 days  [address] [address]  [phone]            Patient's Medications   Discharge Prescriptions    DOXYCYCLINE HYCLATE (VIBRAMYCIN) 100 MG CAPSULE    Take 1 capsule (100 mg total) by mouth 2 (two) times a day for 10 days       Start Date: 1/9/2022  End Date: 1/19/2022       Order Dose: 100 mg       Quantity: 20 capsule    Refills: 0       No discharge procedures on file      PDMP Review     None          ED Provider  Electronically Signed by           Andra Nugent MD  01/09/22 4877

## 2025-05-15 ENCOUNTER — OFFICE VISIT (OUTPATIENT)
Dept: FAMILY MEDICINE CLINIC | Facility: CLINIC | Age: 50
End: 2025-05-15
Payer: COMMERCIAL

## 2025-05-15 VITALS
BODY MASS INDEX: 35.19 KG/M2 | HEART RATE: 70 BPM | SYSTOLIC BLOOD PRESSURE: 112 MMHG | DIASTOLIC BLOOD PRESSURE: 66 MMHG | WEIGHT: 205.03 LBS | OXYGEN SATURATION: 98 % | TEMPERATURE: 97.2 F

## 2025-05-15 DIAGNOSIS — E66.812 CLASS 2 OBESITY DUE TO EXCESS CALORIES WITHOUT SERIOUS COMORBIDITY WITH BODY MASS INDEX (BMI) OF 37.0 TO 37.9 IN ADULT: ICD-10-CM

## 2025-05-15 DIAGNOSIS — Z00.00 ANNUAL PHYSICAL EXAM: Primary | ICD-10-CM

## 2025-05-15 DIAGNOSIS — M54.42 CHRONIC LEFT-SIDED LOW BACK PAIN WITH LEFT-SIDED SCIATICA: ICD-10-CM

## 2025-05-15 DIAGNOSIS — G43.109 MIGRAINE WITH AURA AND WITHOUT STATUS MIGRAINOSUS, NOT INTRACTABLE: ICD-10-CM

## 2025-05-15 DIAGNOSIS — E66.09 CLASS 2 OBESITY DUE TO EXCESS CALORIES WITHOUT SERIOUS COMORBIDITY WITH BODY MASS INDEX (BMI) OF 37.0 TO 37.9 IN ADULT: ICD-10-CM

## 2025-05-15 DIAGNOSIS — G89.29 CHRONIC LEFT-SIDED LOW BACK PAIN WITH LEFT-SIDED SCIATICA: ICD-10-CM

## 2025-05-15 PROCEDURE — 99396 PREV VISIT EST AGE 40-64: CPT | Performed by: PHYSICIAN ASSISTANT

## 2025-05-15 PROCEDURE — 99213 OFFICE O/P EST LOW 20 MIN: CPT | Performed by: PHYSICIAN ASSISTANT
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
## 2025-05-15 NOTE — PATIENT INSTRUCTIONS
Bupropion 100 mg   Week 1: 1 tablet (100 mg) each morning   Week 2: 1 tablet (100 mg) AM and 1 tablet (100 mg) PM   Week 3: 2 tablets (200 mg) AM  and 1 tablet (100 mg) PM   Week 4: 2 tablets (200 mg) AM  and 2 tablets (200 mg) PM     Naltrexone 50 mg   Week 1 & 2: 1/2 tablet (25 mg total) daily   Week 3 and on: 1 tablet (50 mg) daily     10

## 2025-05-15 NOTE — ASSESSMENT & PLAN NOTE
Patient continues to have intermittent left sided low back pain with left-sided radiculopathy.  He has not been recently
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
## 2025-05-15 NOTE — ASSESSMENT & PLAN NOTE
Contrave has been given to the patient in the past and he is now at the point where mentally he is ready to start therapy.

## 2025-05-27 ENCOUNTER — TELEPHONE (OUTPATIENT)
Age: 50
End: 2025-05-27

## 2025-05-27 NOTE — TELEPHONE ENCOUNTER
Patient called in regards to the Bupropion. Patient would like to know if he is doing well on the dosage should he still increase as instructions state. Patient would also like to know when he is done with medication will he be continuing taking medication after or will dosage go up. Patient is requesting a call back.

## 2025-05-27 NOTE — TELEPHONE ENCOUNTER
Please let the patient know that if he is getting good appetite suppression, he would not need to accelerate the dosing.  He needs to continue the medication when it is finished because I believe I only gave him a 1 month supply to make sure he was tolerating it.  He needs to let me know when he needs additional medication ordered.    Rubio

## 2025-06-09 ENCOUNTER — TELEPHONE (OUTPATIENT)
Dept: FAMILY MEDICINE CLINIC | Facility: CLINIC | Age: 50
End: 2025-06-09

## 2025-06-09 DIAGNOSIS — E66.812 CLASS 2 OBESITY DUE TO EXCESS CALORIES WITHOUT SERIOUS COMORBIDITY WITH BODY MASS INDEX (BMI) OF 37.0 TO 37.9 IN ADULT: ICD-10-CM

## 2025-06-09 DIAGNOSIS — E66.09 CLASS 2 OBESITY DUE TO EXCESS CALORIES WITHOUT SERIOUS COMORBIDITY WITH BODY MASS INDEX (BMI) OF 37.0 TO 37.9 IN ADULT: ICD-10-CM

## 2025-06-09 DIAGNOSIS — J45.990 EXERCISE-INDUCED ASTHMA: ICD-10-CM

## 2025-06-09 RX ORDER — LEVALBUTEROL TARTRATE 45 UG/1
1-2 AEROSOL, METERED ORAL EVERY 4 HOURS PRN
Qty: 45 G | Refills: 1 | Status: SHIPPED | OUTPATIENT
Start: 2025-06-09

## 2025-06-09 RX ORDER — NALTREXONE HYDROCHLORIDE 50 MG/1
TABLET, FILM COATED ORAL
Qty: 21 TABLET | Refills: 0 | Status: CANCELLED | OUTPATIENT
Start: 2025-06-09

## 2025-06-09 RX ORDER — NALTREXONE HYDROCHLORIDE 50 MG/1
TABLET, FILM COATED ORAL
Qty: 90 TABLET | Refills: 0 | Status: SHIPPED | OUTPATIENT
Start: 2025-06-09

## 2025-06-09 RX ORDER — BUPROPION HYDROCHLORIDE 100 MG/1
TABLET ORAL
Qty: 360 TABLET | Refills: 0 | Status: SHIPPED | OUTPATIENT
Start: 2025-06-09

## 2025-06-09 NOTE — TELEPHONE ENCOUNTER
Patient called to request a refill for their     buPROPion (WELLBUTRIN) 100 mg tablet   Patient reports he take 1 tablet in the monring and 1 tablet in the evening    Update sig directions      Patient called to request a refill for their naltrexone (REVIA) 50 mg tablet     Patient reports he takes 1 tablet daily  Update sig directions        Pharmacy : [facility] # [id number]  Does the patient have enough for 3 days?   [] Yes   [x] No - Send as HP to POD

## 2025-06-09 NOTE — TELEPHONE ENCOUNTER
Reason for call:   [x] Refill   [] Prior Auth  [] Other:     Office:   [x] PCP/Provider -  Ofelia Bradford, /[facility]   [] Specialty/Provider -     Medication:     levalbuterol (XOPENEX HFA) 45 mcg/act inhaler       Dose/Frequency:  Inhale 1-2 puffs every 4 (four) hours as needed for wheezing,     Quantity: 45    Pharmacy: [facility]/pharmacy #[id number] - [place], [place] - [address] [facility]   Does the patient have enough for 3 days?   [] Yes   [x] No - Send as HP to POD

## 2025-06-16 ENCOUNTER — TELEPHONE (OUTPATIENT)
Age: 50
End: 2025-06-16

## 2025-06-16 ENCOUNTER — HOSPITAL ENCOUNTER (EMERGENCY)
Facility: HOSPITAL | Age: 50
Discharge: HOME | End: 2025-06-16
Attending: EMERGENCY MEDICINE | Admitting: EMERGENCY MEDICINE
Payer: COMMERCIAL

## 2025-06-16 ENCOUNTER — APPOINTMENT (EMERGENCY)
Dept: RADIOLOGY | Facility: HOSPITAL | Age: 50
End: 2025-06-16
Payer: COMMERCIAL

## 2025-06-16 VITALS
SYSTOLIC BLOOD PRESSURE: 138 MMHG | WEIGHT: 217.15 LBS | RESPIRATION RATE: 18 BRPM | BODY MASS INDEX: 37.27 KG/M2 | HEART RATE: 80 BPM | DIASTOLIC BLOOD PRESSURE: 82 MMHG | TEMPERATURE: 96.8 F | OXYGEN SATURATION: 96 %

## 2025-06-16 DIAGNOSIS — K59.00 CONSTIPATION: Primary | ICD-10-CM

## 2025-06-16 PROCEDURE — 99283 EMERGENCY DEPT VISIT LOW MDM: CPT

## 2025-06-16 PROCEDURE — 99284 EMERGENCY DEPT VISIT MOD MDM: CPT | Performed by: EMERGENCY MEDICINE

## 2025-06-16 PROCEDURE — 74018 RADEX ABDOMEN 1 VIEW: CPT

## 2025-06-16 RX ORDER — POLYETHYLENE GLYCOL 3350 17 G/17G
17 POWDER, FOR SOLUTION ORAL ONCE
Status: COMPLETED | OUTPATIENT
Start: 2025-06-16 | End: 2025-06-16

## 2025-06-16 RX ADMIN — POLYETHYLENE GLYCOL 3350 17 G: 17 POWDER, FOR SOLUTION ORAL at 11:48

## 2025-06-16 NOTE — TELEPHONE ENCOUNTER
Patient called, state she was seen in Emergency Room due to constipation from prescribed medication.Confirmed providers availability, patient request a callback with suggestions. Please advise patient if any further questions.

## 2025-06-16 NOTE — ED NOTES
Patient reporting he had a large BM, provider aware of same. To hold off on enema at this time.      Juanita Hernandez RN  06/16/25 1933

## 2025-06-16 NOTE — ED PROVIDER NOTES
Time reflects when diagnosis was documented in both MDM as applicable and the Disposition within this note       Time User Action Codes Description Comment    6/16/2025 11:51 AM Joe Aguilar Add [K59.00] Constipation     6/16/2025 11:51 AM Joe Aguilar Modify [K59.00] Constipation Drug-induced          ED Disposition       ED Disposition   Discharge    Condition   Stable    Date/Time   Mon Jun 16, 2025 11:51 AM    Comment   Walter Pride discharge to home/self care.                   Assessment & Plan       Medical Decision Making  Patient presented to the emergency department and a MSE was performed. The patient was evaluated for complaint related to acute abdominal pain in a male patient.  Patient is potentially at risk for, but not limited to, acute gastritis, pancreatitis, biliary colic, constipation, appendicitis, diverticulitis, diverticulosis, ulcerative colitis, or other disease process unrelated to the abdomen which may cause this symptomatology is also considered. Several of these diagnoses have been evaluated and ruled out by history and physical.  As needed, patient will be further evaluated with laboratory and imaging studies.  Higher level diagnostics, such as CT imaging or ultrasound, may also be required.  Please see work-up portion of the note for further evaluation of patient's risk.  Socioeconomic factors were also considered as part of the decision-making process.  Unless otherwise stated in the chart or patient is admitted as elsewhere documented, any previously prescribed medications will be maintained.      Problems Addressed:  Constipation: acute illness or injury     Details: Likely medication related.  Discussed with patient outpatient modalities he can use.  Also recommended to the patient that he discuss with his primary care physician the continued use of these medications.    Amount and/or Complexity of Data Reviewed  Radiology: ordered and independent interpretation  performed. Decision-making details documented in ED Course.    Risk  OTC drugs.             Medications   polyethylene glycol (MIRALAX) packet 17 g (17 g Oral Given 6/16/25 1148)       ED Risk Strat Scores                    No data recorded        SBIRT 22yo+      Flowsheet Row Most Recent Value   Initial Alcohol Screen: US AUDIT-C     1. How often do you have a drink containing alcohol? 0 Filed at: 06/16/2025 1054   2. How many drinks containing alcohol do you have on a typical day you are drinking?  0 Filed at: 06/16/2025 1054   3a. Male UNDER 65: How often do you have five or more drinks on one occasion? 0 Filed at: 06/16/2025 1054   3b. FEMALE Any Age, or MALE 65+: How often do you have 4 or more drinks on one occassion? 0 Filed at: 06/16/2025 1054   Audit-C Score 0 Filed at: 06/16/2025 1054   IRVIN: How many times in the past year have you...    Used an illegal drug or used a prescription medication for non-medical reasons? Never Filed at: 06/16/2025 1054                            History of Present Illness       Chief Complaint   Patient presents with    Constipation     Patient reports over the past 2 days and urgency to have BM but unable to pass any stool. Abdominal discomfort. Taking wellbutrin and naltrexone for weight loss       Past Medical History[1]   Past Surgical History[2]   Family History[3]   Social History[4]   E-Cigarette/Vaping    E-Cigarette Use Never User       E-Cigarette/Vaping Substances    Nicotine No     THC No     CBD No     Flavoring No     Other No     Unknown No       I have reviewed and agree with the history as documented.     50-year-old male to the emergency room complaining of constipation and the urgency to move his bowels.  Patient reports that he has not been able to move his bowels for the last 2 days and is now feeling distended and having some discomfort.  Is able to urinate without difficulty.  Patient reports that new medications include Wellbutrin and ReVia.  Denies  any surgical history.      History provided by:  Patient  Constipation  Associated symptoms: no diarrhea, no nausea and no vomiting        Review of Systems   Gastrointestinal:  Positive for abdominal distention and constipation. Negative for diarrhea, nausea and vomiting.           Objective       ED Triage Vitals [06/16/25 1053]   Temperature Pulse Blood Pressure Respirations SpO2 Patient Position - Orthostatic VS   (!) 96.8 °F (36 °C) 80 138/82 18 96 % Sitting      Temp Source Heart Rate Source BP Location FiO2 (%) Pain Score    Temporal Monitor -- -- 8      Vitals      Date and Time Temp Pulse SpO2 Resp BP Pain Score FACES Pain Rating User   06/16/25 1053 96.8 °F (36 °C) 80 96 % 18 138/82 8 -- AS            Physical Exam  Vitals and nursing note reviewed.   Constitutional:       General: He is in acute distress.      Appearance: He is normal weight. He is not ill-appearing or toxic-appearing.   HENT:      Head: Normocephalic and atraumatic.      Right Ear: External ear normal.      Left Ear: External ear normal.      Nose: Nose normal.      Mouth/Throat:      Mouth: Mucous membranes are moist.   Pulmonary:      Effort: Pulmonary effort is normal. No respiratory distress.   Abdominal:      General: Abdomen is flat. Bowel sounds are decreased. There is distension.      Tenderness: There is generalized abdominal tenderness.     Musculoskeletal:         General: No signs of injury.     Skin:     Coloration: Skin is not pale.     Neurological:      General: No focal deficit present.      Mental Status: He is alert.     Psychiatric:         Mood and Affect: Mood normal.         Thought Content: Thought content normal.         Judgment: Judgment normal.         Results Reviewed       None            XR abdomen 1 view kub   ED Interpretation by Joe Aguilar DO (06/16 5178)   No acute findings          Procedures    ED Medication and Procedure Management   Prior to Admission Medications   Prescriptions Last Dose  Informant Patient Reported? Taking?   benzonatate (TESSALON PERLES) 100 mg capsule   No No   Sig: Take 1 capsule (100 mg total) by mouth every 8 (eight) hours   buPROPion (WELLBUTRIN) 100 mg tablet   No No   Sig: Take 2 pills in a.m. and 2 pills in the p.m.   fluticasone (FLONASE) 50 mcg/act nasal spray   No No   Si sprays into each nostril daily   levalbuterol (XOPENEX HFA) 45 mcg/act inhaler   No No   Sig: Inhale 1-2 puffs every 4 (four) hours as needed for wheezing   loratadine (CLARITIN) 10 mg tablet   No No   Sig: Take 1 tablet (10 mg total) by mouth daily   naltrexone (REVIA) 50 mg tablet   No No   Sig: Take 1 pill in a.m.   sildenafil (VIAGRA) 50 MG tablet   No No   Sig: Take 1 tablet (50 mg total) by mouth daily as needed for erectile dysfunction      Facility-Administered Medications: None     Discharge Medication List as of 2025 11:52 AM        CONTINUE these medications which have NOT CHANGED    Details   buPROPion (WELLBUTRIN) 100 mg tablet Take 2 pills in a.m. and 2 pills in the p.m., Normal      naltrexone (REVIA) 50 mg tablet Take 1 pill in a.m., Normal      benzonatate (TESSALON PERLES) 100 mg capsule Take 1 capsule (100 mg total) by mouth every 8 (eight) hours, Starting Mon 2/3/2025, Normal      fluticasone (FLONASE) 50 mcg/act nasal spray 2 sprays into each nostril daily, Starting Mon 2/3/2025, Normal      levalbuterol (XOPENEX HFA) 45 mcg/act inhaler Inhale 1-2 puffs every 4 (four) hours as needed for wheezing, Starting 2025, Normal      loratadine (CLARITIN) 10 mg tablet Take 1 tablet (10 mg total) by mouth daily, Starting Mon 2/3/2025, Normal      sildenafil (VIAGRA) 50 MG tablet Take 1 tablet (50 mg total) by mouth daily as needed for erectile dysfunction, Starting Wed 10/9/2024, Normal           No discharge procedures on file.  ED SEPSIS DOCUMENTATION   Time reflects when diagnosis was documented in both MDM as applicable and the Disposition within this note       Time User  Action Codes Description Comment    6/16/2025 11:51 AM Joe Aguilar Add [K59.00] Constipation     6/16/2025 11:51 AM Joe Aguilar Modify [K59.00] Constipation Drug-induced                     [1]   Past Medical History:  Diagnosis Date    Asthma     Kidney stones     Migraines    [2]   Past Surgical History:  Procedure Laterality Date    KIDNEY STONE SURGERY      TOOTH EXTRACTION  12/26/2022   [3]   Family History  Problem Relation Name Age of Onset    Lung cancer Mother      Lung cancer Father     [4]   Social History  Tobacco Use    Smoking status: Never    Smokeless tobacco: Never   Vaping Use    Vaping status: Never Used   Substance Use Topics    Alcohol use: Never    Drug use: Never        Joe Aguilar,   06/16/25 1215

## 2025-06-16 NOTE — TELEPHONE ENCOUNTER
Please let the patient know that the constipation may be related to his weight loss meds and lack of fiber.  He should stay off the medications until he gets his constipation straightened out.  I will see him in August and we can talk about potential treatments at that time.    Rubio

## 2025-06-16 NOTE — TELEPHONE ENCOUNTER
Pt called in relayed the chart not. He would want to know if he gets better from constipation can he start back his weight loss medication or should stop until he see the provider in the month of August. Please advise him by an return call. Thanks

## 2025-06-16 NOTE — DISCHARGE INSTRUCTIONS
You may use over-the-counter medication such as MiraLAX or Dulcolax to help relieve any further constipation.  Return with any worsening.    We recommend that you discuss with your provider whether or not you should continue the ReVia    Thank you for choosing the emergency department at [facility]. We appreciated the opportunity and privilege to address your healthcare needs. We remain available to you should you require additional evaluation or assistance. We value your feedback and would appreciate the opportunity to address anything you identified as an opportunity to improve or where we excelled. If there are colleagues who deserve special recognition, please let us know! We hope you are feeling better soon!    Please also note that sometimes there are subtle abnormalities in your lab values that you may observe when you access your record online.  These are frequently not worrisome and if they are of concern we will have discussed them with you.  However, we always encourage that you discuss any concerns you may have or observe on your record with your primary care provider.   Please also note that while your visit documentation was reviewed prior to completion, voice transcription will occasionally recognize words or grammar differently than what was spoken.

## 2025-06-17 NOTE — TELEPHONE ENCOUNTER
Please let the patient know that when his constipation has been remedied, he can start back on the weight loss pill but he must get enough fiber and roughage in his diet to prevent another onset of constipation.    Rubio

## 2025-07-07 ENCOUNTER — OFFICE VISIT (OUTPATIENT)
Dept: URGENT CARE | Facility: CLINIC | Age: 50
End: 2025-07-07
Payer: COMMERCIAL

## 2025-07-07 VITALS
RESPIRATION RATE: 14 BRPM | HEART RATE: 75 BPM | BODY MASS INDEX: 32.98 KG/M2 | DIASTOLIC BLOOD PRESSURE: 66 MMHG | OXYGEN SATURATION: 96 % | WEIGHT: 193.2 LBS | HEIGHT: 64 IN | SYSTOLIC BLOOD PRESSURE: 118 MMHG | TEMPERATURE: 96.2 F

## 2025-07-07 DIAGNOSIS — K52.9 GASTROENTERITIS: Primary | ICD-10-CM

## 2025-07-07 PROCEDURE — S9083 URGENT CARE CENTER GLOBAL: HCPCS

## 2025-07-07 PROCEDURE — G0382 LEV 3 HOSP TYPE B ED VISIT: HCPCS

## 2025-07-07 RX ORDER — ONDANSETRON 4 MG/1
4 TABLET, FILM COATED ORAL EVERY 8 HOURS PRN
Qty: 20 TABLET | Refills: 0 | Status: SHIPPED | OUTPATIENT
Start: 2025-07-07

## 2025-07-07 NOTE — PATIENT INSTRUCTIONS
Take Zofran as prescribed. Increase fluid intake. Utilize BRAT Diet (bananas, rice, applesauce, toast). Advance to normal diet as tolerated.     Tylenol/ibuprofen for pain/fever.  Imodium for severe diarrhea only.     Follow up with PCP in 3-5 days.  Proceed to  ER if symptoms worsen.    If tests have been performed at [facility], our office will contact you with results if changes need to be made to the care plan discussed with you at the visit.  You can review your full results on [facility] MyChart.

## 2025-07-07 NOTE — LETTER
July 10, 2025     Patient: Walter Pride   YOB: 1975   Date of Visit: 7/7/2025       To Whom it May Concern:    Walter Pride was seen in my clinic on 7/7/2025. He may return to work on 7/9/25.     Sincerely,      ANTHONY Garcia

## 2025-07-07 NOTE — PROGRESS NOTES
[facility]        NAME: Walter Pride is a 50 y.o. male  : 1975    MRN: [id number]  DATE: 2025  TIME: 2:59 PM    Assessment and Plan   Gastroenteritis [K52.9]  1. Gastroenteritis  ondansetron (ZOFRAN) 4 mg tablet        No acute red flag findings on exam or reported by patient. Symptoms are likely viral in nature given recent exposure to son who is ill with similar symptoms. BRAT diet discussed. Zofran for management of nausea. Imodium for severe diarrhea only. Tylenol/ibuprofen for pain/fever. Increased fluids & rest. May continue with other OTC and supportive therapies at home. Encouraged to follow up closely with family physician or healthcare provider. ED precautions provided/discussed. Patient in agreement with plan & verbalized understanding. Work note provided.     Patient Instructions   Take Zofran as prescribed. Increase fluid intake. Utilize BRAT Diet (bananas, rice, applesauce, toast). Advance to normal diet as tolerated.     Tylenol/ibuprofen for pain/fever.  Imodium for severe diarrhea only.     Follow up with PCP in 3-5 days.  Proceed to  ER if symptoms worsen.    If tests have been performed at [facility], our office will contact you with results if changes need to be made to the care plan discussed with you at the visit.  [facility] on [facility].    Chief Complaint     Chief Complaint   Patient presents with    Vomiting     Vomiting and diarrhea over night - feeling a little bit better today   Needs a work note          History of Present Illness       Patient is a 50 year old male who presents today for evaluation of nausea, vomiting, and diarrhea beginning yesterday. Patient states that he has recently been around his son who has been ill with similar symptoms. Patient reports that he is feeling as though his symptoms are improving throughout the day today. He states that he has vomited x2 today and has had diarrhea x2 today, although  "is experiencing continuous nausea. He denies experiencing any fever, ABD pain, SOB, or CP. He has not taken any medications for his symptoms.               Review of Systems   Review of Systems   Constitutional:  Positive for appetite change and fatigue. Negative for activity change, chills, diaphoresis and fever.   HENT:  Negative for congestion, ear pain, rhinorrhea, sore throat and trouble swallowing.    Respiratory:  Negative for cough, chest tightness and shortness of breath.    Cardiovascular:  Negative for chest pain and palpitations.   Gastrointestinal:  Positive for diarrhea, nausea and vomiting. Negative for abdominal distention, abdominal pain, blood in stool and constipation.   Genitourinary:  Negative for decreased urine volume and difficulty urinating.   Musculoskeletal:  Negative for arthralgias, back pain, gait problem, joint swelling and myalgias.   Skin:  Negative for color change, pallor and rash.   Neurological:  Negative for dizziness, weakness, light-headedness, numbness and headaches.   Hematological:  Negative for adenopathy.         Current Medications     Current Medications[1]    Current Allergies     Allergies as of 07/07/2025 - Reviewed 07/07/2025   Allergen Reaction Noted    Other Other (See Comments) 02/24/2017            The following portions of the patient's history were reviewed and updated as appropriate: allergies, current medications, past family history, past medical history, past social history, past surgical history and problem list.     Past Medical History[2]    Past Surgical History[3]    Family History[4]      Medications have been verified.        Objective   /66   Pulse 75   Temp (!) 96.2 °F (35.7 °C)   Resp 14   Ht 5' 4\" (1.626 m)   Wt 87.6 kg (193 lb 3.2 oz)   SpO2 96%   BMI 33.16 kg/m²   No LMP for male patient.       Physical Exam     Physical Exam  Vitals and nursing note reviewed.   Constitutional:       General: He is awake. He is not in acute " distress.     Appearance: Normal appearance. He is ill-appearing. He is not toxic-appearing or diaphoretic.   HENT:      Head: Normocephalic and atraumatic.      Right Ear: External ear normal.      Left Ear: External ear normal.      Nose: Nose normal. No congestion or rhinorrhea.      Mouth/Throat:      Lips: Pink. No lesions.      Mouth: Mucous membranes are moist.      Pharynx: Oropharynx is clear. Uvula midline. No pharyngeal swelling, oropharyngeal exudate, posterior oropharyngeal erythema or uvula swelling.     Eyes:      Extraocular Movements: Extraocular movements intact.      Conjunctiva/sclera: Conjunctivae normal.      Pupils: Pupils are equal, round, and reactive to light.       Cardiovascular:      Rate and Rhythm: Normal rate and regular rhythm.      Pulses: Normal pulses.      Heart sounds: Normal heart sounds.   Pulmonary:      Effort: Pulmonary effort is normal. No respiratory distress.      Breath sounds: Normal breath sounds. No stridor. No wheezing, rhonchi or rales.   Chest:      Chest wall: No tenderness.   Abdominal:      General: Abdomen is flat. There is no distension.      Palpations: Abdomen is soft. There is no mass.      Tenderness: There is abdominal tenderness in the right upper quadrant, epigastric area and left lower quadrant. There is no guarding.      Hernia: No hernia is present.     Musculoskeletal:         General: No swelling, tenderness, deformity or signs of injury. Normal range of motion.      Cervical back: Normal range of motion and neck supple. No tenderness.   Lymphadenopathy:      Cervical: No cervical adenopathy.     Skin:     General: Skin is warm.      Capillary Refill: Capillary refill takes less than 2 seconds.      Coloration: Skin is not jaundiced or pale.      Findings: No bruising, erythema, lesion or rash.     Neurological:      General: No focal deficit present.      Mental Status: He is alert.      Sensory: No sensory deficit.      Motor: No weakness.       Gait: Gait normal.     Psychiatric:         Mood and Affect: Mood normal.         Behavior: Behavior normal. Behavior is cooperative.         Thought Content: Thought content normal.         Judgment: Judgment normal.                        [1]   Current Outpatient Medications:     buPROPion (WELLBUTRIN) 100 mg tablet, Take 2 pills in a.m. and 2 pills in the p.m., Disp: 360 tablet, Rfl: 0    fluticasone (FLONASE) 50 mcg/act nasal spray, 2 sprays into each nostril daily, Disp: 16 g, Rfl: 0    levalbuterol (XOPENEX HFA) 45 mcg/act inhaler, Inhale 1-2 puffs every 4 (four) hours as needed for wheezing, Disp: 45 g, Rfl: 1    loratadine (CLARITIN) 10 mg tablet, Take 1 tablet (10 mg total) by mouth daily, Disp: 30 tablet, Rfl: 0    naltrexone (REVIA) 50 mg tablet, Take 1 pill in a.m., Disp: 90 tablet, Rfl: 0    ondansetron (ZOFRAN) 4 mg tablet, Take 1 tablet (4 mg total) by mouth every 8 (eight) hours as needed for nausea or vomiting, Disp: 20 tablet, Rfl: 0    sildenafil (VIAGRA) 50 MG tablet, Take 1 tablet (50 mg total) by mouth daily as needed for erectile dysfunction, Disp: 10 tablet, Rfl: 11    benzonatate (TESSALON PERLES) 100 mg capsule, Take 1 capsule (100 mg total) by mouth every 8 (eight) hours (Patient not taking: Reported on 7/7/2025), Disp: 21 capsule, Rfl: 0  [2]   Past Medical History:  Diagnosis Date    Asthma     Kidney stones     Migraines    [3]   Past Surgical History:  Procedure Laterality Date    KIDNEY STONE SURGERY      TOOTH EXTRACTION  12/26/2022   [4]   Family History  Problem Relation Name Age of Onset    Lung cancer Mother      Lung cancer Father

## 2025-07-15 ENCOUNTER — TELEPHONE (OUTPATIENT)
Age: 50
End: 2025-07-15

## 2025-08-18 ENCOUNTER — OFFICE VISIT (OUTPATIENT)
Dept: FAMILY MEDICINE CLINIC | Facility: CLINIC | Age: 50
End: 2025-08-18
Payer: COMMERCIAL

## 2025-08-18 ENCOUNTER — APPOINTMENT (OUTPATIENT)
Dept: LAB | Facility: CLINIC | Age: 50
End: 2025-08-18
Attending: PHYSICIAN ASSISTANT
Payer: COMMERCIAL

## 2025-08-18 VITALS
BODY MASS INDEX: 31.83 KG/M2 | SYSTOLIC BLOOD PRESSURE: 104 MMHG | HEART RATE: 74 BPM | WEIGHT: 185.41 LBS | TEMPERATURE: 98.6 F | DIASTOLIC BLOOD PRESSURE: 68 MMHG | OXYGEN SATURATION: 98 %

## 2025-08-18 DIAGNOSIS — E66.09 CLASS 1 OBESITY DUE TO EXCESS CALORIES WITHOUT SERIOUS COMORBIDITY WITH BODY MASS INDEX (BMI) OF 31.0 TO 31.9 IN ADULT: Primary | ICD-10-CM

## 2025-08-18 DIAGNOSIS — R74.01 TRANSAMINASEMIA: ICD-10-CM

## 2025-08-18 DIAGNOSIS — M10.071 ACUTE IDIOPATHIC GOUT OF RIGHT FOOT: ICD-10-CM

## 2025-08-18 DIAGNOSIS — N52.9 ERECTILE DYSFUNCTION, UNSPECIFIED ERECTILE DYSFUNCTION TYPE: ICD-10-CM

## 2025-08-18 DIAGNOSIS — E66.811 CLASS 1 OBESITY DUE TO EXCESS CALORIES WITHOUT SERIOUS COMORBIDITY WITH BODY MASS INDEX (BMI) OF 31.0 TO 31.9 IN ADULT: Primary | ICD-10-CM

## 2025-08-18 LAB
ALBUMIN SERPL BCG-MCNC: 4.2 G/DL (ref 3.5–5)
ALP SERPL-CCNC: 73 U/L (ref 34–104)
ALT SERPL W P-5'-P-CCNC: 17 U/L (ref 7–52)
ANION GAP SERPL CALCULATED.3IONS-SCNC: 8 MMOL/L (ref 4–13)
AST SERPL W P-5'-P-CCNC: 18 U/L (ref 13–39)
BILIRUB SERPL-MCNC: 0.54 MG/DL (ref 0.2–1)
BUN SERPL-MCNC: 9 MG/DL (ref 5–25)
CALCIUM SERPL-MCNC: 9.3 MG/DL (ref 8.4–10.2)
CHLORIDE SERPL-SCNC: 103 MMOL/L (ref 96–108)
CO2 SERPL-SCNC: 29 MMOL/L (ref 21–32)
CREAT SERPL-MCNC: 0.99 MG/DL (ref 0.6–1.3)
GFR SERPL CREATININE-BSD FRML MDRD: 87 ML/MIN/1.73SQ M
GLUCOSE SERPL-MCNC: 94 MG/DL (ref 65–140)
POTASSIUM SERPL-SCNC: 3.8 MMOL/L (ref 3.5–5.3)
PROT SERPL-MCNC: 7.2 G/DL (ref 6.4–8.4)
SODIUM SERPL-SCNC: 140 MMOL/L (ref 135–147)
URATE SERPL-MCNC: 4.3 MG/DL (ref 3.5–8.5)

## 2025-08-18 PROCEDURE — 36415 COLL VENOUS BLD VENIPUNCTURE: CPT

## 2025-08-18 PROCEDURE — 84550 ASSAY OF BLOOD/URIC ACID: CPT

## 2025-08-18 PROCEDURE — 99213 OFFICE O/P EST LOW 20 MIN: CPT | Performed by: PHYSICIAN ASSISTANT

## 2025-08-18 PROCEDURE — 80053 COMPREHEN METABOLIC PANEL: CPT

## 2025-08-18 RX ORDER — SILDENAFIL 50 MG/1
50 TABLET, FILM COATED ORAL DAILY PRN
Qty: 10 TABLET | Refills: 3 | Status: SHIPPED | OUTPATIENT
Start: 2025-08-18